# Patient Record
Sex: FEMALE | Race: AMERICAN INDIAN OR ALASKA NATIVE | Employment: FULL TIME | URBAN - METROPOLITAN AREA
[De-identification: names, ages, dates, MRNs, and addresses within clinical notes are randomized per-mention and may not be internally consistent; named-entity substitution may affect disease eponyms.]

---

## 2019-04-22 ENCOUNTER — OP HISTORICAL/CONVERTED ENCOUNTER (OUTPATIENT)
Dept: OTHER | Age: 69
End: 2019-04-22

## 2019-05-06 ENCOUNTER — OP HISTORICAL/CONVERTED ENCOUNTER (OUTPATIENT)
Dept: OTHER | Age: 69
End: 2019-05-06

## 2019-05-07 ENCOUNTER — OP HISTORICAL/CONVERTED ENCOUNTER (OUTPATIENT)
Dept: OTHER | Age: 69
End: 2019-05-07

## 2019-05-08 ENCOUNTER — IP HISTORICAL/CONVERTED ENCOUNTER (OUTPATIENT)
Dept: OTHER | Age: 69
End: 2019-05-08

## 2019-05-13 ENCOUNTER — OFFICE VISIT (OUTPATIENT)
Dept: ENDOCRINOLOGY | Age: 69
End: 2019-05-13

## 2019-05-13 VITALS
HEIGHT: 61 IN | HEART RATE: 72 BPM | RESPIRATION RATE: 18 BRPM | TEMPERATURE: 97.3 F | SYSTOLIC BLOOD PRESSURE: 124 MMHG | OXYGEN SATURATION: 98 % | BODY MASS INDEX: 26.88 KG/M2 | WEIGHT: 142.4 LBS | DIASTOLIC BLOOD PRESSURE: 76 MMHG

## 2019-05-13 DIAGNOSIS — E20.9 HYPOPARATHYROIDISM, UNSPECIFIED HYPOPARATHYROIDISM TYPE (HCC): ICD-10-CM

## 2019-05-13 DIAGNOSIS — C73 THYROID CANCER (HCC): Primary | ICD-10-CM

## 2019-05-13 DIAGNOSIS — E89.0 POSTOPERATIVE HYPOTHYROIDISM: ICD-10-CM

## 2019-05-13 RX ORDER — ATORVASTATIN CALCIUM 20 MG/1
TABLET, FILM COATED ORAL DAILY
COMMUNITY

## 2019-05-13 RX ORDER — DESLORATADINE 5 MG/1
5 TABLET ORAL
COMMUNITY
End: 2019-08-15 | Stop reason: ALTCHOICE

## 2019-05-13 RX ORDER — CALCITRIOL 0.5 UG/1
0.5 CAPSULE ORAL DAILY
Qty: 60 CAP | Refills: 1 | Status: SHIPPED | OUTPATIENT
Start: 2019-05-13 | End: 2019-08-15 | Stop reason: ALTCHOICE

## 2019-05-13 RX ORDER — ERGOCALCIFEROL 1.25 MG/1
50000 CAPSULE ORAL
COMMUNITY
End: 2019-08-15 | Stop reason: ALTCHOICE

## 2019-05-13 NOTE — PROGRESS NOTES
1. Have you been to the ER, urgent care clinic since your last visit? Hospitalized since your last visit? No    2. Have you seen or consulted any other health care providers outside of the [facility] since your last visit? Include any pap smears or colon screening.  No       Chief Complaint   Patient presents with    New Patient     pt here for thyroid issues     Learning assessment complete    Wt Readings from Last 3 Encounters:   05/13/19 142 lb 6.4 oz (64.6 kg)     Temp Readings from Last 3 Encounters:   05/13/19 97.3 °F (36.3 °C) (Oral)     BP Readings from Last 3 Encounters:   05/13/19 124/76     Pulse Readings from Last 3 Encounters:   05/13/19 72

## 2019-05-13 NOTE — PROGRESS NOTES
HISTORY OF PRESENT ILLNESS  Ada Jarvis is a 79 y.o. female. HPI  Patient is referred by Dr. Naveen Hernandez for management of Papillary  Thyroid cancer       Patient in general stayed in good state of health until  has Noticed hoarse voice 4 weeks ago   Got treated with antibiotics for a week, but hoarseness persisted     She saw Dr. Naveen Hernandez and he ordered Ct neck/ usg neck   That showed prescence of thyroid mass   He found left vocal cord palsy on laryngoscopy     He ordered for FNA of thyroid mass and left supraclavicular LN  Which revealed PTC  Ct scan showed locally invasive metastatic tissue    She underwent near total thyroidectomy with radical dissection of ipsilateral LN and central dissection of LN surgery  On May 8 2019 at [facility]            History reviewed. No pertinent past medical history.     Social History     Socioeconomic History    Marital status: UNKNOWN     Spouse name: Not on file    Number of children: Not on file    Years of education: Not on file    Highest education level: Not on file   Occupational History    Not on file   Social Needs    Financial resource strain: Not on file    Food insecurity:     Worry: Not on file     Inability: Not on file    Transportation needs:     Medical: Not on file     Non-medical: Not on file   Tobacco Use    Smoking status: Never Smoker    Smokeless tobacco: Never Used   Substance and Sexual Activity    Alcohol use: Not on file    Drug use: Not on file    Sexual activity: Not on file   Lifestyle    Physical activity:     Days per week: Not on file     Minutes per session: Not on file    Stress: Not on file   Relationships    Social connections:     Talks on phone: Not on file     Gets together: Not on file     Attends Protestant service: Not on file     Active member of club or organization: Not on file     Attends meetings of clubs or organizations: Not on file     Relationship status: Not on file    Intimate partner violence:     Fear of current or ex partner: Not on file     Emotionally abused: Not on file     Physically abused: Not on file     Forced sexual activity: Not on file   Other Topics Concern    Not on file   Social History Narrative    Not on file       History reviewed. No pertinent family history. Review of Systems   Constitutional: Negative. HENT: Negative. Eyes: Negative. Respiratory: Negative. Negative for wheezing (hoarse voice). Cardiovascular: Negative. Gastrointestinal: Negative. Genitourinary: Negative. Musculoskeletal: Negative. Skin: Negative. Neurological: Negative. Endo/Heme/Allergies: Negative. Psychiatric/Behavioral: Negative. Physical Exam   Constitutional: She is oriented to person, place, and time. She appears well-developed and well-nourished. HENT:   Head: Normocephalic. Eyes: Pupils are equal, round, and reactive to light. Conjunctivae and EOM are normal.   Neck: Normal range of motion. Neck supple. Thyromegaly (recent surgery for thyroidectomy ) present. Cardiovascular: Normal rate and regular rhythm. Pulmonary/Chest: Effort normal and breath sounds normal.   Abdominal: Soft. Bowel sounds are normal.   Musculoskeletal: Normal range of motion. Neurological: She is alert and oriented to person, place, and time. She has normal reflexes. Skin: Skin is warm and dry. Psychiatric: She has a normal mood and affect. April 22 2019 :  CT showed several low density nodules causing mass effect on left thyroid  4.1cm by 2.8 cm by 2.3 cm complex septated partially calcified mass   Several masses on left jugular vein , large supraclavivular mass 2.2 by 1.4 by 2.1 cm extending to the level of hyoid   Right lobe is normal        ASSESSMENT and PLAN    PTC METASTATIC, LOCALLY INVASIVE   S/P NEAR TOTAL THYROIDECTOMY  WITH LEFT SIDED RADICAL NECK DISSECTION     surg path :  May 8 2019   Left lobe PTC 4 cm   Central neck 3/6 LN metastatic  Left lateral neck  4/9 LN metastatic Right lobe  1 mm focus   Largest metastatic LN is 2.8 cm    AJCC  8th  pT2, pN1b   surg path   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    Definitely, patient is anxious  And requires more counseling sessions         HYPOTHYROIDISM : expected   Educated on its signs and symptoms       HYPOCALCEMIA , POST SURGICAL : on calcium supplements   Wrote  For CALCITRIOL for PRN use

## 2019-05-13 NOTE — LETTER
6/12/2019 8:44 PM 
 
Patient:  Corky Phillips YOB: 1950 Date of Visit: 5/13/2019 Dear Leland Dai [address] [place] [id number] VIA Facsimile: [phone] 
 : 
 
 
Thank you for referring Ms. Corky Phillips to me for evaluation/treatment. Below are the relevant portions of my assessment and plan of care. 1. Have you been to the ER, urgent care clinic since your last visit? Hospitalized since your last visit? No 
 
2. Have you seen or consulted any other health care providers outside of the [address] [place] [address] since your last visit? Include any pap smears or colon screening. No  
 
 
Chief Complaint Patient presents with  New Patient  
  pt here for thyroid issues Learning assessment complete Wt Readings from Last 3 Encounters:  
05/13/19 142 lb 6.4 oz (64.6 kg) Temp Readings from Last 3 Encounters:  
05/13/19 97.3 °F (36.3 °C) (Oral) BP Readings from Last 3 Encounters:  
05/13/19 124/76 Pulse Readings from Last 3 Encounters:  
05/13/19 72 HISTORY OF PRESENT ILLNESS Marquis Valdez is a 79 y.o. female. HPI Patient is referred by Dr. Ember Mittal for management of Papillary  Thyroid cancer Patient in general stayed in good state of health until  has Noticed hoarse voice 4 weeks ago Got treated with antibiotics for a week, but hoarseness persisted She saw Dr. Ember Mittal and he ordered Ct neck/ usg neck That showed prescence of thyroid mass He found left vocal cord palsy on laryngoscopy He ordered for FNA of thyroid mass and left supraclavicular LN  Which revealed PTC Ct scan showed locally invasive metastatic tissue She underwent near total thyroidectomy with radical dissection of ipsilateral LN and central dissection of LN surgery  On May 8 2019 at [facility] History reviewed. No pertinent past medical history. Social History Socioeconomic History  Marital status: UNKNOWN  
 Spouse name: Not on file  Number of children: Not on file  Years of education: Not on file  Highest education level: Not on file Occupational History  Not on file Social Needs  Financial resource strain: Not on file  Food insecurity:  
  Worry: Not on file Inability: Not on file  Transportation needs:  
  Medical: Not on file Non-medical: Not on file Tobacco Use  Smoking status: Never Smoker  Smokeless tobacco: Never Used Substance and Sexual Activity  Alcohol use: Not on file  Drug use: Not on file  Sexual activity: Not on file Lifestyle  Physical activity:  
  Days per week: Not on file Minutes per session: Not on file  Stress: Not on file Relationships  Social connections:  
  Talks on phone: Not on file Gets together: Not on file Attends [employer] service: Not on file Active member of club or organization: Not on file Attends meetings of clubs or organizations: Not on file Relationship status: Not on file  Intimate partner violence:  
  Fear of current or ex partner: Not on file Emotionally abused: Not on file Physically abused: Not on file Forced sexual activity: Not on file Other Topics Concern  Not on file Social History Narrative  Not on file History reviewed. No pertinent family history. Review of Systems Constitutional: Negative. HENT: Negative. Eyes: Negative. Respiratory: Negative. Negative for wheezing (hoarse voice). Cardiovascular: Negative. Gastrointestinal: Negative. Genitourinary: Negative. Musculoskeletal: Negative. Skin: Negative. Neurological: Negative. Endo/Heme/Allergies: Negative. Psychiatric/Behavioral: Negative. Physical Exam  
Constitutional: She is oriented to person, place, and time. She appears well-developed and well-nourished. HENT:  
Head: Normocephalic. Eyes: Pupils are equal, round, and reactive to light. Conjunctivae and EOM are normal.  
Neck: Normal range of motion. Neck supple. Thyromegaly (recent surgery for thyroidectomy ) present. Cardiovascular: Normal rate and regular rhythm. Pulmonary/Chest: Effort normal and breath sounds normal.  
Abdominal: Soft. Bowel sounds are normal.  
Musculoskeletal: Normal range of motion. Neurological: She is alert and oriented to person, place, and time. She has normal reflexes. Skin: Skin is warm and dry. Psychiatric: She has a normal mood and affect. April 22 2019 :  CT showed several low density nodules causing mass effect on left thyroid  4.1cm by 2.8 cm by 2.3 cm complex septated partially calcified mass Several masses on left jugular vein , large supraclavivular mass 2.2 by 1.4 by 2.1 cm extending to the level of hyoid Right lobe is normal 
 
 
 
ASSESSMENT and PLAN 
 
PTC METASTATIC, LOCALLY INVASIVE  
S/P NEAR TOTAL THYROIDECTOMY  WITH LEFT SIDED RADICAL NECK DISSECTION  
 
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 
 
VLADISLAV  RISK STRATIFICATION : INTERMEDIATE for recurrence WILL DO FRIAS ABLATION  
STARTED ON CYTOMEL - 25 MCG BID DOSING as pt is planning on a vacation soon WILL STOP CYTOMEL AFTER USE FOR 10 DAYS aTLEAST 2 WEEKS PRIOR FRIAS Discussed the care plan. Definitely, patient is anxious  And requires more counseling sessions HYPOTHYROIDISM : expected Educated on its signs and symptoms HYPOCALCEMIA , POST SURGICAL : on calcium supplements Wrote  For CALCITRIOL for PRN use If you have questions, please do not hesitate to call me. I look forward to following Ms. Miah Guo along with you. Sincerely, Clara Duarte MD

## 2019-05-13 NOTE — PATIENT INSTRUCTIONS
Stay on calcium (  Chewables   ) 600 mg 3 to 4 times a day       [phone]  Is my phone number       rocaltriol     0.5 mcg twice a day    (do not start until I say )

## 2019-05-14 DIAGNOSIS — E20.9 HYPOPARATHYROIDISM, UNSPECIFIED HYPOPARATHYROIDISM TYPE (HCC): ICD-10-CM

## 2019-05-14 DIAGNOSIS — C73 THYROID CANCER (HCC): ICD-10-CM

## 2019-05-15 LAB
ALBUMIN SERPL-MCNC: 4.2 G/DL (ref 3.6–4.8)
ALBUMIN/GLOB SERPL: 1.6 {RATIO} (ref 1.2–2.2)
ALP SERPL-CCNC: 90 IU/L (ref 39–117)
ALT SERPL-CCNC: 22 IU/L (ref 0–32)
AST SERPL-CCNC: 23 IU/L (ref 0–40)
BILIRUB SERPL-MCNC: 0.4 MG/DL (ref 0–1.2)
BUN SERPL-MCNC: 12 MG/DL (ref 8–27)
BUN/CREAT SERPL: 20 (ref 12–28)
CALCIUM SERPL-MCNC: 8.2 MG/DL (ref 8.7–10.3)
CHLORIDE SERPL-SCNC: 97 MMOL/L (ref 96–106)
CO2 SERPL-SCNC: 25 MMOL/L (ref 20–29)
CREAT SERPL-MCNC: 0.61 MG/DL (ref 0.57–1)
GLOBULIN SER CALC-MCNC: 2.6 G/DL (ref 1.5–4.5)
GLUCOSE SERPL-MCNC: 98 MG/DL (ref 65–99)
MAGNESIUM SERPL-MCNC: 1.9 MG/DL (ref 1.6–2.3)
POTASSIUM SERPL-SCNC: 4.6 MMOL/L (ref 3.5–5.2)
PROT SERPL-MCNC: 6.8 G/DL (ref 6–8.5)
PTH-INTACT SERPL-MCNC: 6 PG/ML (ref 15–65)
SODIUM SERPL-SCNC: 141 MMOL/L (ref 134–144)
THYROGLOB AB SERPL-ACNC: <1 IU/ML (ref 0–0.9)
TSH SERPL DL<=0.005 MIU/L-ACNC: 3.27 UIU/ML (ref 0.45–4.5)

## 2019-05-15 RX ORDER — LIOTHYRONINE SODIUM 25 UG/1
25 TABLET ORAL 2 TIMES DAILY
Qty: 60 TAB | Refills: 0 | Status: SHIPPED | OUTPATIENT
Start: 2019-05-15 | End: 2019-06-26 | Stop reason: ALTCHOICE

## 2019-05-21 DIAGNOSIS — E20.9 HYPOPARATHYROIDISM, UNSPECIFIED HYPOPARATHYROIDISM TYPE (HCC): ICD-10-CM

## 2019-05-21 DIAGNOSIS — C73 THYROID CANCER (HCC): ICD-10-CM

## 2019-05-26 LAB
25(OH)D3+25(OH)D2 SERPL-MCNC: 45.1 NG/ML (ref 30–100)
ALBUMIN SERPL-MCNC: 4.2 G/DL (ref 3.6–4.8)
ALBUMIN/GLOB SERPL: 1.5 {RATIO} (ref 1.2–2.2)
ALP SERPL-CCNC: 109 IU/L (ref 39–117)
ALT SERPL-CCNC: 21 IU/L (ref 0–32)
AST SERPL-CCNC: 22 IU/L (ref 0–40)
BILIRUB SERPL-MCNC: 0.4 MG/DL (ref 0–1.2)
BUN SERPL-MCNC: 11 MG/DL (ref 8–27)
BUN/CREAT SERPL: 16 (ref 12–28)
CALCIUM SERPL-MCNC: 8.6 MG/DL (ref 8.7–10.3)
CHLORIDE SERPL-SCNC: 101 MMOL/L (ref 96–106)
CO2 SERPL-SCNC: 23 MMOL/L (ref 20–29)
CREAT SERPL-MCNC: 0.67 MG/DL (ref 0.57–1)
GLOBULIN SER CALC-MCNC: 2.8 G/DL (ref 1.5–4.5)
GLUCOSE SERPL-MCNC: 115 MG/DL (ref 65–99)
POTASSIUM SERPL-SCNC: 5 MMOL/L (ref 3.5–5.2)
PROT SERPL-MCNC: 7 G/DL (ref 6–8.5)
PTH-INTACT SERPL-MCNC: 14 PG/ML (ref 15–65)
SODIUM SERPL-SCNC: 140 MMOL/L (ref 134–144)
T3FREE SERPL-MCNC: 6.1 PG/ML (ref 2–4.4)
T4 FREE SERPL-MCNC: 0.28 NG/DL (ref 0.82–1.77)
THYROGLOB AB SERPL-ACNC: <1 IU/ML (ref 0–0.9)
THYROGLOB SERPL-MCNC: 2.9 NG/ML
TSH SERPL DL<=0.005 MIU/L-ACNC: 0.3 UIU/ML (ref 0.45–4.5)

## 2019-05-28 DIAGNOSIS — C73 THYROID CANCER (HCC): ICD-10-CM

## 2019-05-28 DIAGNOSIS — E20.9 HYPOPARATHYROIDISM, UNSPECIFIED HYPOPARATHYROIDISM TYPE (HCC): ICD-10-CM

## 2019-06-03 DIAGNOSIS — C73 THYROID CANCER (HCC): Primary | ICD-10-CM

## 2019-06-03 LAB
ALBUMIN SERPL-MCNC: 4.1 G/DL (ref 3.6–4.8)
ALBUMIN/GLOB SERPL: 1.6 {RATIO} (ref 1.2–2.2)
ALP SERPL-CCNC: 105 IU/L (ref 39–117)
ALT SERPL-CCNC: 31 IU/L (ref 0–32)
AST SERPL-CCNC: 30 IU/L (ref 0–40)
BASOPHILS # BLD AUTO: 0 X10E3/UL (ref 0–0.2)
BASOPHILS NFR BLD AUTO: 0 %
BILIRUB SERPL-MCNC: 0.6 MG/DL (ref 0–1.2)
BUN SERPL-MCNC: 14 MG/DL (ref 8–27)
BUN/CREAT SERPL: 21 (ref 12–28)
CALCIT SERPL-MCNC: <2 PG/ML (ref 0–5)
CALCIUM SERPL-MCNC: 9.1 MG/DL (ref 8.7–10.3)
CHLORIDE SERPL-SCNC: 99 MMOL/L (ref 96–106)
CO2 SERPL-SCNC: 25 MMOL/L (ref 20–29)
CREAT SERPL-MCNC: 0.66 MG/DL (ref 0.57–1)
EOSINOPHIL # BLD AUTO: 0.1 X10E3/UL (ref 0–0.4)
EOSINOPHIL NFR BLD AUTO: 2 %
ERYTHROCYTE [DISTWIDTH] IN BLOOD BY AUTOMATED COUNT: 13.2 % (ref 12.3–15.4)
GLOBULIN SER CALC-MCNC: 2.5 G/DL (ref 1.5–4.5)
GLUCOSE SERPL-MCNC: 114 MG/DL (ref 65–99)
HCT VFR BLD AUTO: 40.4 % (ref 34–46.6)
HGB BLD-MCNC: 13.2 G/DL (ref 11.1–15.9)
IMM GRANULOCYTES # BLD AUTO: 0 X10E3/UL (ref 0–0.1)
IMM GRANULOCYTES NFR BLD AUTO: 0 %
LYMPHOCYTES # BLD AUTO: 1.6 X10E3/UL (ref 0.7–3.1)
LYMPHOCYTES NFR BLD AUTO: 23 %
MCH RBC QN AUTO: 30.6 PG (ref 26.6–33)
MCHC RBC AUTO-ENTMCNC: 32.7 G/DL (ref 31.5–35.7)
MCV RBC AUTO: 94 FL (ref 79–97)
MONOCYTES # BLD AUTO: 0.4 X10E3/UL (ref 0.1–0.9)
MONOCYTES NFR BLD AUTO: 6 %
NEUTROPHILS # BLD AUTO: 5 X10E3/UL (ref 1.4–7)
NEUTROPHILS NFR BLD AUTO: 69 %
PLATELET # BLD AUTO: 409 X10E3/UL (ref 150–450)
POTASSIUM SERPL-SCNC: 5 MMOL/L (ref 3.5–5.2)
PROT SERPL-MCNC: 6.6 G/DL (ref 6–8.5)
PTH-INTACT SERPL-MCNC: 28 PG/ML (ref 15–65)
RBC # BLD AUTO: 4.32 X10E6/UL (ref 3.77–5.28)
SODIUM SERPL-SCNC: 140 MMOL/L (ref 134–144)
THYROGLOB AB SERPL-ACNC: <1 IU/ML (ref 0–0.9)
THYROGLOB SERPL-MCNC: <2 NG/ML
TSH SERPL DL<=0.005 MIU/L-ACNC: 0.1 UIU/ML (ref 0.45–4.5)
WBC # BLD AUTO: 7.2 X10E3/UL (ref 3.4–10.8)

## 2019-06-10 ENCOUNTER — OFFICE VISIT (OUTPATIENT)
Dept: ENDOCRINOLOGY | Age: 69
End: 2019-06-10

## 2019-06-10 VITALS
BODY MASS INDEX: 27.3 KG/M2 | RESPIRATION RATE: 18 BRPM | HEART RATE: 77 BPM | TEMPERATURE: 97.2 F | WEIGHT: 144.6 LBS | SYSTOLIC BLOOD PRESSURE: 130 MMHG | HEIGHT: 61 IN | OXYGEN SATURATION: 98 % | DIASTOLIC BLOOD PRESSURE: 76 MMHG

## 2019-06-10 DIAGNOSIS — E20.9 HYPOPARATHYROIDISM, UNSPECIFIED HYPOPARATHYROIDISM TYPE (HCC): ICD-10-CM

## 2019-06-10 DIAGNOSIS — C73 THYROID CANCER (HCC): Primary | ICD-10-CM

## 2019-06-10 RX ORDER — ACETAMINOPHEN 325 MG/1
TABLET ORAL
COMMUNITY

## 2019-06-10 NOTE — PATIENT INSTRUCTIONS
--------------------------------------------------------------------------------------------    Refills    -    please call your pharmacy and have them send us a refill request    Results  -  allow up to a week for lab results to be processed and reviewed. Phone calls  -  Allow upto 24 hrs.  for non-urgent calls to be retained    Prior authorization - It may take up to 4 weeks to process, depending on your insurance    Forms  -  FMLA, DMV, patient assistance, etc. will take up to 2 weeks to process    Cancellations - please notify the office in advance if you cannot keep your appointment    Samples  - will only be dispensed at visits as supply is limited      If you are having a medical emergency call 911    --------------------------------------------------------------------------------------------

## 2019-06-10 NOTE — PROGRESS NOTES
1. Have you been to the ER, urgent care clinic since your last visit? No  Hospitalized since your last visit? No    2. Have you seen or consulted any other health care providers outside of the [address] [place] [address] since your last visit? Include any pap smears or colon screening.  No      Wt Readings from Last 3 Encounters:   06/10/19 144 lb 9.6 oz (65.6 kg)   05/13/19 142 lb 6.4 oz (64.6 kg)     Temp Readings from Last 3 Encounters:   06/10/19 97.2 °F (36.2 °C) (Oral)   05/13/19 97.3 °F (36.3 °C) (Oral)     BP Readings from Last 3 Encounters:   06/10/19 130/76   05/13/19 124/76     Pulse Readings from Last 3 Encounters:   06/10/19 77   05/13/19 72

## 2019-06-12 DIAGNOSIS — C73 THYROID CANCER (HCC): Primary | ICD-10-CM

## 2019-06-13 DIAGNOSIS — C73 THYROID CANCER (HCC): Primary | ICD-10-CM

## 2019-06-14 LAB
25(OH)D3+25(OH)D2 SERPL-MCNC: 38.3 NG/ML (ref 30–100)
ALBUMIN SERPL-MCNC: 4.2 G/DL (ref 3.6–4.8)
ALBUMIN/GLOB SERPL: 1.7 {RATIO} (ref 1.2–2.2)
ALP SERPL-CCNC: 89 IU/L (ref 39–117)
ALT SERPL-CCNC: 28 IU/L (ref 0–32)
AST SERPL-CCNC: 31 IU/L (ref 0–40)
BILIRUB SERPL-MCNC: 0.4 MG/DL (ref 0–1.2)
BUN SERPL-MCNC: 7 MG/DL (ref 8–27)
BUN/CREAT SERPL: 9 (ref 12–28)
CALCIUM SERPL-MCNC: 9.3 MG/DL (ref 8.7–10.3)
CHLORIDE SERPL-SCNC: 99 MMOL/L (ref 96–106)
CO2 SERPL-SCNC: 24 MMOL/L (ref 20–29)
CREAT SERPL-MCNC: 0.74 MG/DL (ref 0.57–1)
GLOBULIN SER CALC-MCNC: 2.5 G/DL (ref 1.5–4.5)
GLUCOSE SERPL-MCNC: 91 MG/DL (ref 65–99)
POTASSIUM SERPL-SCNC: 4.6 MMOL/L (ref 3.5–5.2)
PROT SERPL-MCNC: 6.7 G/DL (ref 6–8.5)
PTH-INTACT SERPL-MCNC: 28 PG/ML (ref 15–65)
SODIUM SERPL-SCNC: 136 MMOL/L (ref 134–144)
THYROGLOB AB SERPL-ACNC: <1 IU/ML
THYROGLOB SERPL-MCNC: 8.9 NG/ML
THYROGLOB SERPL-MCNC: NORMAL NG/ML
TSH SERPL DL<=0.005 MIU/L-ACNC: 27.74 UIU/ML (ref 0.45–4.5)

## 2019-06-17 DIAGNOSIS — C73 THYROID CANCER (HCC): ICD-10-CM

## 2019-06-18 LAB — TSH SERPL DL<=0.005 MIU/L-ACNC: 37.98 UIU/ML (ref 0.45–4.5)

## 2019-06-19 ENCOUNTER — HOSPITAL ENCOUNTER (OUTPATIENT)
Dept: NUCLEAR MEDICINE | Age: 69
Discharge: HOME | End: 2019-06-19
Attending: INTERNAL MEDICINE
Payer: COMMERCIAL

## 2019-06-19 DIAGNOSIS — C73 THYROID CANCER (HCC): ICD-10-CM

## 2019-06-19 PROCEDURE — A9517 I131 IODIDE CAP, RX: HCPCS

## 2019-06-26 ENCOUNTER — HOSPITAL ENCOUNTER (OUTPATIENT)
Dept: NUCLEAR MEDICINE | Age: 69
Discharge: HOME | End: 2019-06-26
Attending: INTERNAL MEDICINE
Payer: COMMERCIAL

## 2019-06-26 DIAGNOSIS — C73 THYROID CANCER (HCC): Primary | ICD-10-CM

## 2019-06-26 DIAGNOSIS — E89.0 POSTOPERATIVE HYPOTHYROIDISM: ICD-10-CM

## 2019-06-26 DIAGNOSIS — C73 THYROID CANCER (HCC): ICD-10-CM

## 2019-06-26 PROCEDURE — 78018 THYROID MET IMAGING BODY: CPT

## 2019-06-26 RX ORDER — LEVOTHYROXINE SODIUM 125 UG/1
125 TABLET ORAL
Qty: 30 TAB | Refills: 6 | Status: SHIPPED | OUTPATIENT
Start: 2019-06-26 | End: 2019-09-30 | Stop reason: DRUGHIGH

## 2019-06-27 DIAGNOSIS — C73 THYROID CANCER (HCC): ICD-10-CM

## 2019-06-27 DIAGNOSIS — E89.0 POSTOPERATIVE HYPOTHYROIDISM: ICD-10-CM

## 2019-06-30 LAB
THYROGLOB AB SERPL-ACNC: <1 IU/ML
THYROGLOB SERPL-MCNC: 160 NG/ML
THYROGLOB SERPL-MCNC: ABNORMAL NG/ML
TSH SERPL DL<=0.005 MIU/L-ACNC: 34.96 UIU/ML (ref 0.45–4.5)

## 2019-08-07 DIAGNOSIS — E20.9 HYPOPARATHYROIDISM, UNSPECIFIED HYPOPARATHYROIDISM TYPE (HCC): ICD-10-CM

## 2019-08-07 DIAGNOSIS — E78.2 MIXED HYPERLIPIDEMIA: ICD-10-CM

## 2019-08-07 DIAGNOSIS — C73 THYROID CANCER (HCC): Primary | ICD-10-CM

## 2019-08-07 DIAGNOSIS — C73 THYROID CANCER (HCC): ICD-10-CM

## 2019-08-07 DIAGNOSIS — Z13.1 DIABETES MELLITUS SCREENING: ICD-10-CM

## 2019-08-15 ENCOUNTER — OFFICE VISIT (OUTPATIENT)
Dept: ENDOCRINOLOGY | Age: 69
End: 2019-08-15

## 2019-08-15 VITALS
HEART RATE: 74 BPM | RESPIRATION RATE: 18 BRPM | SYSTOLIC BLOOD PRESSURE: 126 MMHG | TEMPERATURE: 96.6 F | BODY MASS INDEX: 27.2 KG/M2 | HEIGHT: 61 IN | DIASTOLIC BLOOD PRESSURE: 80 MMHG | OXYGEN SATURATION: 100 % | WEIGHT: 144.1 LBS

## 2019-08-15 DIAGNOSIS — C73 THYROID CANCER (HCC): ICD-10-CM

## 2019-08-15 DIAGNOSIS — E89.0 POSTOPERATIVE HYPOTHYROIDISM: Primary | ICD-10-CM

## 2019-08-15 DIAGNOSIS — R73.03 PRE-DIABETES: ICD-10-CM

## 2019-08-15 NOTE — LETTER
8/18/19 Patient: Yassine Mckenna YOB: 1950 Date of Visit: 8/15/2019 Derian Hernandez MD 
[address] [facility] [address] [address] [place] [address] VIA Facsimile: [phone] Dear Derian Hernandez MD, Thank you for referring Ms. Audrey Grayson to [facility] for evaluation. My notes for this consultation are attached. If you have questions, please do not hesitate to call me. I look forward to following your patient along with you. Sincerely, Taryn Correa MD

## 2019-08-15 NOTE — PROGRESS NOTES
HISTORY OF PRESENT ILLNESS  Bill Wade is a 81 y.o. female. HPI  Patient is here for follow up  for management of Papillary  Thyroid cancer    Last visit  From  June 2019         She is here with her    Feels at times a bit anxious , feels warmth on palms and soles     Denies any palpitations  Weight is stable       Old history      Gained 2 lbs since last seen   Returned from vacation   On the phone, asked her to stop cytomel on may 28 2019     She feels tired and has slight wheeze on conversing   She is taking allegra tylenol PRN basis   In the interim,  I  Had  a counseling session  Soon after the initial visit . Old history :   Patient in general good state of health has Noticed hoarse voice 4 weeks ago   Got treated with antibiotics for a week, but hoarseness persisted     She saw Dr. Elizabeth Malin and he ordered Ct/ usg neck   He found left vocal cord palsy on laryngoscopy     He ordered for FNA of thyroid mass and left supraclavicular LN  Which revealed PTC  Ct scan showed locally invasive metastatic tissues involvement       She underwent near total thyroidectomy with radical dissection of ipsilateral LN and central dissection of LN surgery  On May 8 2019 at [facility]            History reviewed. No pertinent past medical history.     Social History     Socioeconomic History    Marital status:      Spouse name: Not on file    Number of children: Not on file    Years of education: Not on file    Highest education level: Not on file   Occupational History    Not on file   Social Needs    Financial resource strain: Not on file    Food insecurity:     Worry: Not on file     Inability: Not on file    Transportation needs:     Medical: Not on file     Non-medical: Not on file   Tobacco Use    Smoking status: Never Smoker    Smokeless tobacco: Never Used   Substance and Sexual Activity    Alcohol use: Not on file    Drug use: Not on file    Sexual activity: Not on file   Lifestyle    Physical activity:     Days per week: Not on file     Minutes per session: Not on file    Stress: Not on file   Relationships    Social connections:     Talks on phone: Not on file     Gets together: Not on file     Attends Christianity service: Not on file     Active member of club or organization: Not on file     Attends meetings of clubs or organizations: Not on file     Relationship status: Not on file    Intimate partner violence:     Fear of current or ex partner: Not on file     Emotionally abused: Not on file     Physically abused: Not on file     Forced sexual activity: Not on file   Other Topics Concern    Not on file   Social History Narrative    Not on file       History reviewed. No pertinent family history. Review of Systems   Constitutional: Negative. HENT: Negative. Eyes: Negative. Respiratory: Negative. Cardiovascular: Negative. Gastrointestinal: Negative. Genitourinary: Negative. Musculoskeletal: Negative. Skin: Negative. Neurological: Negative. Endo/Heme/Allergies: Negative. Psychiatric/Behavioral: Negative. Physical Exam   Constitutional: She is oriented to person, place, and time. She appears well-developed and well-nourished. HENT:   Head: Normocephalic. Eyes: Pupils are equal, round, and reactive to light. Conjunctivae and EOM are normal.   Neck: Normal range of motion. Neck supple. (healed surgical scar for thyroidectomy ) present. Cardiovascular: Normal rate and regular rhythm. Pulmonary/Chest: Effort normal and breath sounds normal.   Abdominal: Soft. Bowel sounds are normal.   Musculoskeletal: Normal range of motion. Neurological: She is alert and oriented to person, place, and time. She has normal reflexes. Skin: Skin is warm and dry. Psychiatric: She has a normal mood and affect.        April 22 2019 :  CT showed several low density nodules causing mass effect on left thyroid  4.1cm by 2.8 cm by 2.3 cm complex septated partially calcified mass   Several masses on left jugular vein , large supraclavivular mass 2.2 by 1.4 by 2.1 cm extending to the level of hyoid   Right lobe is normal      ASSESSMENT and PLAN    PTC METASTATIC, LOCALLY INVASIVE   S/P NEAR TOTAL THYROIDECTOMY  WITH LEFT SIDED RADICAL NECK DISSECTION     surg path :  May 8 2019   Left lobe PTC 4 cm   Central neck 3/6 LN metastatic  Left lateral neck  4/9 LN metastatic   Right lobe  1 mm focus   Largest metastatic LN is 2.8 cm    AJCC  8th  pT2, pN1b   surg path   Pt 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 recurrent laryngeal nerve involved- it is therefore  PT4a, p N1b   It is STAGE IV     VLADISLAV  RISK STRATIFICATION : INTERMEDIATE for recurrence     S/p   FRIAS ABLATION   June 13 2019   WBS - localized in neck, no other spread         HYPOTHYROIDISM :    On SYNTHRODI 125 MCG DOSE   TSH  IS SUPPRESSED AND IS NEEDED FOR THYROID CANCER MANAGEMENT       HYPOCALCEMIA , POST SURGICAL : resolved   She is on general calcium supplementation     Pre- diabetes : A1c is 5.7  %      > 50 % of time is spent on counseling   Patient voiced understanding her plan of care                 > 50 % of time is spent on counseling , time spent longer for counseling session  Patient voiced understanding her plan of care

## 2019-08-15 NOTE — PROGRESS NOTES
1. Have you been to the ER, urgent care clinic since your last visit? No  Hospitalized since your last visit? No    2. Have you seen or consulted any other health care providers outside of the [address] [place] [address] since your last visit? Include any pap smears or colon screening.  No     Wt Readings from Last 3 Encounters:   08/15/19 144 lb 1.6 oz (65.4 kg)   06/10/19 144 lb 9.6 oz (65.6 kg)   05/13/19 142 lb 6.4 oz (64.6 kg)     Temp Readings from Last 3 Encounters:   08/15/19 96.6 °F (35.9 °C) (Oral)   06/10/19 97.2 °F (36.2 °C) (Oral)   05/13/19 97.3 °F (36.3 °C) (Oral)     BP Readings from Last 3 Encounters:   08/15/19 126/80   06/10/19 130/76   05/13/19 124/76     Pulse Readings from Last 3 Encounters:   08/15/19 74   06/10/19 77   05/13/19 72

## 2019-08-15 NOTE — PATIENT INSTRUCTIONS
Levothyroxine  125 mcg  A day, on empty stomach with water only, no other meds or food or drinks   For next half hour       Take any kind of vitamins, calcium, iron   Pills  4 hours later

## 2019-08-17 LAB
25(OH)D3+25(OH)D2 SERPL-MCNC: 46.1 NG/ML (ref 30–100)
ALBUMIN SERPL-MCNC: 4.2 G/DL (ref 3.6–4.8)
ALBUMIN/GLOB SERPL: 1.8 {RATIO} (ref 1.2–2.2)
ALP SERPL-CCNC: 85 IU/L (ref 39–117)
ALT SERPL-CCNC: 25 IU/L (ref 0–32)
AST SERPL-CCNC: 26 IU/L (ref 0–40)
BASOPHILS # BLD AUTO: 0 X10E3/UL (ref 0–0.2)
BASOPHILS NFR BLD AUTO: 1 %
BILIRUB SERPL-MCNC: 0.4 MG/DL (ref 0–1.2)
BUN SERPL-MCNC: 10 MG/DL (ref 8–27)
BUN/CREAT SERPL: 14 (ref 12–28)
CALCIUM SERPL-MCNC: 9.1 MG/DL (ref 8.7–10.3)
CHLORIDE SERPL-SCNC: 100 MMOL/L (ref 96–106)
CHOLEST SERPL-MCNC: 135 MG/DL (ref 100–199)
CO2 SERPL-SCNC: 24 MMOL/L (ref 20–29)
CREAT SERPL-MCNC: 0.69 MG/DL (ref 0.57–1)
EOSINOPHIL # BLD AUTO: 0.1 X10E3/UL (ref 0–0.4)
EOSINOPHIL NFR BLD AUTO: 1 %
ERYTHROCYTE [DISTWIDTH] IN BLOOD BY AUTOMATED COUNT: 13.3 % (ref 12.3–15.4)
EST. AVERAGE GLUCOSE BLD GHB EST-MCNC: 117 MG/DL
GLOBULIN SER CALC-MCNC: 2.4 G/DL (ref 1.5–4.5)
GLUCOSE SERPL-MCNC: 84 MG/DL (ref 65–99)
HBA1C MFR BLD: 5.7 % (ref 4.8–5.6)
HCT VFR BLD AUTO: 39.1 % (ref 34–46.6)
HDLC SERPL-MCNC: 62 MG/DL
HGB BLD-MCNC: 12.6 G/DL (ref 11.1–15.9)
IMM GRANULOCYTES # BLD AUTO: 0 X10E3/UL (ref 0–0.1)
IMM GRANULOCYTES NFR BLD AUTO: 0 %
INTERPRETATION, 910389: NORMAL
LDLC SERPL CALC-MCNC: 61 MG/DL (ref 0–99)
LYMPHOCYTES # BLD AUTO: 1.3 X10E3/UL (ref 0.7–3.1)
LYMPHOCYTES NFR BLD AUTO: 24 %
MCH RBC QN AUTO: 30.7 PG (ref 26.6–33)
MCHC RBC AUTO-ENTMCNC: 32.2 G/DL (ref 31.5–35.7)
MCV RBC AUTO: 95 FL (ref 79–97)
MONOCYTES # BLD AUTO: 0.5 X10E3/UL (ref 0.1–0.9)
MONOCYTES NFR BLD AUTO: 8 %
NEUTROPHILS # BLD AUTO: 3.6 X10E3/UL (ref 1.4–7)
NEUTROPHILS NFR BLD AUTO: 66 %
PHOSPHATE SERPL-MCNC: 4.4 MG/DL (ref 2.5–4.5)
PLATELET # BLD AUTO: 373 X10E3/UL (ref 150–450)
POTASSIUM SERPL-SCNC: 4.7 MMOL/L (ref 3.5–5.2)
PROT SERPL-MCNC: 6.6 G/DL (ref 6–8.5)
PTH-INTACT SERPL-MCNC: 23 PG/ML (ref 15–65)
RBC # BLD AUTO: 4.11 X10E6/UL (ref 3.77–5.28)
SODIUM SERPL-SCNC: 140 MMOL/L (ref 134–144)
T4 FREE SERPL-MCNC: 2.03 NG/DL (ref 0.82–1.77)
THYROGLOB AB SERPL-ACNC: 3.6 IU/ML
THYROGLOB SERPL-MCNC: 4.4 NG/ML
THYROGLOB SERPL-MCNC: ABNORMAL NG/ML
TRIGL SERPL-MCNC: 60 MG/DL (ref 0–149)
TSH SERPL DL<=0.005 MIU/L-ACNC: 0.03 UIU/ML (ref 0.45–4.5)
VLDLC SERPL CALC-MCNC: 12 MG/DL (ref 5–40)
WBC # BLD AUTO: 5.5 X10E3/UL (ref 3.4–10.8)

## 2019-09-26 DIAGNOSIS — E89.0 POSTOPERATIVE HYPOTHYROIDISM: ICD-10-CM

## 2019-09-26 DIAGNOSIS — R73.03 PRE-DIABETES: ICD-10-CM

## 2019-09-26 DIAGNOSIS — C73 THYROID CANCER (HCC): ICD-10-CM

## 2019-09-27 LAB
EST. AVERAGE GLUCOSE BLD GHB EST-MCNC: 117 MG/DL
HBA1C MFR BLD: 5.7 % (ref 4.8–5.6)
T4 FREE SERPL-MCNC: 2.35 NG/DL (ref 0.82–1.77)
TSH SERPL DL<=0.005 MIU/L-ACNC: <0.006 UIU/ML (ref 0.45–4.5)

## 2019-09-30 ENCOUNTER — OFFICE VISIT (OUTPATIENT)
Dept: ENDOCRINOLOGY | Age: 69
End: 2019-09-30

## 2019-09-30 VITALS
DIASTOLIC BLOOD PRESSURE: 72 MMHG | HEART RATE: 68 BPM | SYSTOLIC BLOOD PRESSURE: 117 MMHG | OXYGEN SATURATION: 100 % | RESPIRATION RATE: 18 BRPM | BODY MASS INDEX: 26.39 KG/M2 | WEIGHT: 139.8 LBS | HEIGHT: 61 IN | TEMPERATURE: 96.7 F

## 2019-09-30 DIAGNOSIS — E89.0 POSTOPERATIVE HYPOTHYROIDISM: Primary | ICD-10-CM

## 2019-09-30 DIAGNOSIS — R73.03 PRE-DIABETES: ICD-10-CM

## 2019-09-30 DIAGNOSIS — C73 THYROID CANCER (HCC): ICD-10-CM

## 2019-09-30 RX ORDER — LEVOTHYROXINE SODIUM 75 UG/1
TABLET ORAL
Qty: 45 TAB | Refills: 12 | Status: SHIPPED | OUTPATIENT
Start: 2019-09-30 | End: 2019-11-12 | Stop reason: DRUGHIGH

## 2019-09-30 RX ORDER — GUAIFENESIN 100 MG/5ML
81 LIQUID (ML) ORAL AS NEEDED
COMMUNITY

## 2019-09-30 NOTE — PROGRESS NOTES
Room 3    Identified pt with two pt identifiers(name and ). Reviewed record in preparation for visit and have obtained necessary documentation. All patient medications has been reviewed. Chief Complaint   Patient presents with    Thyroid Problem       Health Maintenance Due   Topic    Hepatitis C Screening     DTaP/Tdap/Td series (1 - Tdap)    Shingrix Vaccine Age 50> (1 of 2)    BREAST CANCER SCRN MAMMOGRAM     FOBT Q 1 YEAR AGE 50-75     GLAUCOMA SCREENING Q2Y     Bone Densitometry (Dexa) Screening     Pneumococcal 65+ years (1 of 2 - PCV13)    Influenza Age 5 to Adult        Vitals:    19 1617   BP: 117/72   Pulse: 68   Resp: 18   Temp: 96.7 °F (35.9 °C)   TempSrc: Oral   SpO2: 100%   Weight: 139 lb 12.8 oz (63.4 kg)   Height: 5' 1\" (1.549 m)   PainSc:   0 - No pain       Coordination of Care Questionnaire:   1) Have you been to an emergency room, urgent care, or hospitalized since your last visit?   no       2. Have seen or consulted any other health care provider since your last visit? NO    3) Do you have an Advanced Directive/ Living Will in place? NO  If yes, do we have a copy on file NO  If no, would you like information NO    Patient is accompanied by self I have received verbal consent from Spencer Sifuentes to discuss any/all medical information while they are present in the room.

## 2019-09-30 NOTE — PATIENT INSTRUCTIONS
Take calcium 500 mg one pill twice   A   Day       ---------------------------------------------------------------------------------------------------------------      unithroid   75 mcg  A day,   BRAND    Extra pill on Tuesday , Thursday and  Saturday     on empty stomach with water only, no other meds or food or drinks   For next half hour         Take any kind of vitamins, calcium, iron   Pills  4 hours later      ------------------------------------------------------------------------------------------------    STOP PILLS ON Tuesday AND Wednesday   On Thursday and Friday - one pill of 75 mcg   Saturday 2 pills . ..      And follow TTS

## 2019-09-30 NOTE — PROGRESS NOTES
HISTORY OF PRESENT ILLNESS  Kvng Dominguez is a 81 y.o. female. HPI  Patient is here for follow up  for management of Papillary  Thyroid cancer    Last visit  From  August 2019        Lost 5 lbs   Feels at times a bit anxious , feels warmth on palms and soles   Here to discuss labs     Old history :      She is here with her    Feels at times a bit anxious , feels warmth on palms and soles     Denies any palpitations  Weight is stable       Old history      Gained 2 lbs since last seen   Returned from vacation   On the phone, asked her to stop cytomel on may 28 2019     She feels tired and has slight wheeze on conversing   She is taking allegra tylenol PRN basis   In the interim,  I  Had  a counseling session  Soon after the initial visit . Old history :   Patient in general good state of health has Noticed hoarse voice 4 weeks ago   Got treated with antibiotics for a week, but hoarseness persisted     She saw Dr. Lisandro Varela and he ordered Ct/ usg neck   He found left vocal cord palsy on laryngoscopy     He ordered for FNA of thyroid mass and left supraclavicular LN  Which revealed PTC  Ct scan showed locally invasive metastatic tissues involvement       She underwent near total thyroidectomy with radical dissection of ipsilateral LN and central dissection of LN surgery  On May 8 2019 at [facility]            No past medical history on file.     Social History     Socioeconomic History    Marital status:      Spouse name: Not on file    Number of children: Not on file    Years of education: Not on file    Highest education level: Not on file   Occupational History    Not on file   Social Needs    Financial resource strain: Not on file    Food insecurity:     Worry: Not on file     Inability: Not on file    Transportation needs:     Medical: Not on file     Non-medical: Not on file   Tobacco Use    Smoking status: Never Smoker    Smokeless tobacco: Never Used   Substance and Sexual Activity    Alcohol use: Yes     Comment: rarely    Drug use: Never    Sexual activity: Not on file   Lifestyle    Physical activity:     Days per week: Not on file     Minutes per session: Not on file    Stress: Not on file   Relationships    Social connections:     Talks on phone: Not on file     Gets together: Not on file     Attends Hoahaoism service: Not on file     Active member of club or organization: Not on file     Attends meetings of clubs or organizations: Not on file     Relationship status: Not on file    Intimate partner violence:     Fear of current or ex partner: Not on file     Emotionally abused: Not on file     Physically abused: Not on file     Forced sexual activity: Not on file   Other Topics Concern    Not on file   Social History Narrative    Not on file       No family history on file. Review of Systems   Constitutional: Negative. HENT: Negative. Eyes: Negative. Respiratory: Negative. Cardiovascular: Negative. Gastrointestinal: Negative. Genitourinary: Negative. Musculoskeletal: Negative. Skin: Negative. Neurological: Negative. Endo/Heme/Allergies: Negative. Psychiatric/Behavioral: Negative. Physical Exam   Constitutional: She is oriented to person, place, and time. She appears well-developed and well-nourished. HENT:   Head: Normocephalic. Eyes: Pupils are equal, round, and reactive to light. Conjunctivae and EOM are normal.   Neck: Normal range of motion. Neck supple. (healed surgical scar for thyroidectomy ) present. Cardiovascular: Normal rate and regular rhythm. Pulmonary/Chest: Effort normal and breath sounds normal.   Abdominal: Soft. Bowel sounds are normal.   Musculoskeletal: Normal range of motion. Neurological: She is alert and oriented to person, place, and time. She has normal reflexes. Skin: Skin is warm and dry. Psychiatric: She has a normal mood and affect.        April 22 2019 :  CT showed several low density nodules causing mass effect on left thyroid  4.1cm by 2.8 cm by 2.3 cm complex septated partially calcified mass   Several masses on left jugular vein , large supraclavivular mass 2.2 by 1.4 by 2.1 cm extending to the level of hyoid   Right lobe is normal      ASSESSMENT and PLAN    PTC METASTATIC, LOCALLY INVASIVE   S/P NEAR TOTAL THYROIDECTOMY  WITH LEFT SIDED RADICAL NECK DISSECTION     surg path :  May 8 2019   Left lobe PTC 4 cm   Central neck 3/6 LN metastatic  Left lateral neck  4/9 LN metastatic   Right lobe  1 mm focus   Largest metastatic LN is 2.8 cm    AJCC  8th  pT2, pN1b   surg path   Pt 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 recurrent laryngeal nerve involved- it is therefore  PT4a, p N1b   It is STAGE IV     VLADISLAV  RISK STRATIFICATION : INTERMEDIATE for recurrence     S/p   FRIAS ABLATION   June 13 2019   WBS - localized in neck, no other spread     TG by [facility] [place], operated by [facility]  In June 2019  Was 8.9     And  TSH was 27      ,  when the thyroid antibodies are negative  TG by [facility]   On aug 7 2019  is 4.4,  when TSH is suppressed to 0.028  And TG is positive   Will do f/u usg and comprehensive TG panel in 3 months , dec 2019         HYPOTHYROIDISM :    On SYNTHRODI 125 MCG DOSE   TSH  IS SUPPRESSED AND it IS NEEDED FOR THYROID CANCER MANAGEMENT     Sept 2019  :   TSH is  0.006 , Suppressed on current dose of 125 mcg dose   Will do 106 mcg a day  ( by making her take 75 mcg unithrodi 10 pills  A week )   One pill every day but on Tue, Thursday and sat 2 pills  A day         HYPOCALCEMIA , POST SURGICAL : resolved   She is on general calcium supplementation     Pre- diabetes : A1c is 5.7  %      > 50 % of time is spent on counseling   Patient voiced understanding her plan of care                 > 50 % of time is spent on counseling , time spent longer for counseling session  Patient voiced understanding her plan of care

## 2019-10-01 DIAGNOSIS — C73 THYROID CANCER (HCC): ICD-10-CM

## 2019-10-01 DIAGNOSIS — E89.0 POSTOPERATIVE HYPOTHYROIDISM: Primary | ICD-10-CM

## 2019-11-08 ENCOUNTER — APPOINTMENT (OUTPATIENT)
Dept: ENDOCRINOLOGY | Age: 69
End: 2019-11-08

## 2019-11-08 DIAGNOSIS — C73 THYROID CANCER (HCC): ICD-10-CM

## 2019-11-08 DIAGNOSIS — E89.0 POSTOPERATIVE HYPOTHYROIDISM: ICD-10-CM

## 2019-11-12 DIAGNOSIS — E89.0 POSTOPERATIVE HYPOTHYROIDISM: Primary | ICD-10-CM

## 2019-11-12 LAB
ALBUMIN SERPL-MCNC: 4.1 G/DL (ref 3.6–4.8)
ALBUMIN/GLOB SERPL: 1.6 {RATIO} (ref 1.2–2.2)
ALP SERPL-CCNC: 91 IU/L (ref 39–117)
ALT SERPL-CCNC: 20 IU/L (ref 0–32)
AST SERPL-CCNC: 21 IU/L (ref 0–40)
BILIRUB SERPL-MCNC: 0.4 MG/DL (ref 0–1.2)
BUN SERPL-MCNC: 9 MG/DL (ref 8–27)
BUN/CREAT SERPL: 12 (ref 12–28)
CALCIUM SERPL-MCNC: 9.1 MG/DL (ref 8.7–10.3)
CHLORIDE SERPL-SCNC: 104 MMOL/L (ref 96–106)
CO2 SERPL-SCNC: 24 MMOL/L (ref 20–29)
CREAT SERPL-MCNC: 0.77 MG/DL (ref 0.57–1)
GLOBULIN SER CALC-MCNC: 2.5 G/DL (ref 1.5–4.5)
GLUCOSE SERPL-MCNC: 100 MG/DL (ref 65–99)
POTASSIUM SERPL-SCNC: 4.8 MMOL/L (ref 3.5–5.2)
PROT SERPL-MCNC: 6.6 G/DL (ref 6–8.5)
SODIUM SERPL-SCNC: 140 MMOL/L (ref 134–144)
T4 FREE SERPL-MCNC: 1.87 NG/DL (ref 0.82–1.77)
THYROGLOB AB SERPL-ACNC: <1 IU/ML
THYROGLOB SERPL-MCNC: 0.1 NG/ML
THYROGLOB SERPL-MCNC: NORMAL NG/ML
TSH SERPL DL<=0.005 MIU/L-ACNC: 0.01 UIU/ML (ref 0.45–4.5)

## 2019-11-12 RX ORDER — LEVOTHYROXINE SODIUM 100 UG/1
TABLET ORAL
Qty: 35 TAB | Refills: 3 | Status: SHIPPED | OUTPATIENT
Start: 2019-11-12 | End: 2020-02-14

## 2019-11-12 NOTE — PROGRESS NOTES
I  Discussed with pt   Will change dose to  100 daily  And take extra half pill which is on sundays (150 mcg )  Which is the same dose as before     Rodrigo Kaur MD

## 2019-12-04 ENCOUNTER — HOSPITAL ENCOUNTER (OUTPATIENT)
Dept: ULTRASOUND IMAGING | Age: 69
Discharge: HOME | End: 2019-12-04
Attending: INTERNAL MEDICINE
Payer: COMMERCIAL

## 2019-12-04 DIAGNOSIS — R73.03 PRE-DIABETES: ICD-10-CM

## 2019-12-04 DIAGNOSIS — E89.0 POSTOPERATIVE HYPOTHYROIDISM: ICD-10-CM

## 2019-12-04 DIAGNOSIS — C73 THYROID CANCER (HCC): ICD-10-CM

## 2019-12-04 PROCEDURE — 76536 US EXAM OF HEAD AND NECK: CPT

## 2019-12-09 ENCOUNTER — HOSPITAL ENCOUNTER (OUTPATIENT)
Dept: LAB | Age: 69
Discharge: HOME | End: 2019-12-09

## 2019-12-09 DIAGNOSIS — E89.0 POSTOPERATIVE HYPOTHYROIDISM: ICD-10-CM

## 2019-12-10 LAB
T4 FREE SERPL-MCNC: 1.7 NG/DL (ref 0.8–1.5)
TSH SERPL DL<=0.05 MIU/L-ACNC: <0.01 UIU/ML (ref 0.36–3.74)

## 2019-12-27 ENCOUNTER — TELEPHONE (OUTPATIENT)
Dept: ENDOCRINOLOGY | Age: 69
End: 2019-12-27

## 2019-12-27 DIAGNOSIS — E89.0 POSTOPERATIVE HYPOTHYROIDISM: Primary | ICD-10-CM

## 2019-12-27 NOTE — TELEPHONE ENCOUNTER
Patient called to schedule lab, unsure if there are open orders for any upcoming I had though she was recently here for some.  Can you please check, thanks

## 2020-01-08 ENCOUNTER — LAB ONLY (OUTPATIENT)
Dept: ENDOCRINOLOGY | Age: 70
End: 2020-01-08

## 2020-01-08 DIAGNOSIS — E89.0 POSTOPERATIVE HYPOTHYROIDISM: Primary | ICD-10-CM

## 2020-01-09 LAB
T4 FREE SERPL-MCNC: 1.95 NG/DL (ref 0.82–1.77)
TSH SERPL DL<=0.005 MIU/L-ACNC: <0.006 UIU/ML (ref 0.45–4.5)

## 2020-01-09 NOTE — PROGRESS NOTES
Just informed her to take full 100 mcg pill Monday to Saturday and on sundays - half pill only       Melissa Parker MD

## 2020-02-14 ENCOUNTER — DOCUMENTATION ONLY (OUTPATIENT)
Dept: ENDOCRINOLOGY | Age: 70
End: 2020-02-14

## 2020-02-14 RX ORDER — LEVOTHYROXINE SODIUM 100 UG/1
TABLET ORAL
Qty: 35 TAB | Refills: 3 | Status: SHIPPED | OUTPATIENT
Start: 2020-02-14 | End: 2020-03-11

## 2020-02-25 NOTE — PROGRESS NOTES
HISTORY OF PRESENT ILLNESS  Pino De Leon is a 79 y.o. female. HPI  Patient is referred by Dr. Liz Rajput for management of Papillary  Thyroid cancer   From  May 13 2019         Gained 2 lbs since last seen   Returned from vacation   On the phone, asked her to stop cytomel on may 28 2019     She feels tired and has slight wheeze on conversing   She is taking allegra tylenol PRN basis   In the interim,  I  Had  a counseling session  Soon after the initial visit . Old history :   Patient in general good state of health has Noticed hoarse voice 4 weeks ago   Got treated with antibiotics for a week, but hoarseness persisted     She saw Dr. Liz Rajput and he ordered Ct/ usg neck   He found left vocal cord palsy on laryngoscopy     He ordered for FNA of thyroid mass and left supraclavicular LN  Which revealed PTC  Ct scan showed locally invasive metastatic tissues involvement       She underwent near total thyroidectomy with radical dissection of ipsilateral LN and central dissection of LN surgery  On May 8 2019 at [facility]            History reviewed. No pertinent past medical history.     Social History     Socioeconomic History    Marital status: UNKNOWN     Spouse name: Not on file    Number of children: Not on file    Years of education: Not on file    Highest education level: Not on file   Occupational History    Not on file   Social Needs    Financial resource strain: Not on file    Food insecurity:     Worry: Not on file     Inability: Not on file    Transportation needs:     Medical: Not on file     Non-medical: Not on file   Tobacco Use    Smoking status: Never Smoker    Smokeless tobacco: Never Used   Substance and Sexual Activity    Alcohol use: Not on file    Drug use: Not on file    Sexual activity: Not on file   Lifestyle    Physical activity:     Days per week: Not on file     Minutes per session: Not on file    Stress: Not on file   Relationships    Social connections:     Talks on phone: Not on file     Gets together: Not on file     Attends Hinduism service: Not on file     Active member of club or organization: Not on file     Attends meetings of clubs or organizations: Not on file     Relationship status: Not on file    Intimate partner violence:     Fear of current or ex partner: Not on file     Emotionally abused: Not on file     Physically abused: Not on file     Forced sexual activity: Not on file   Other Topics Concern    Not on file   Social History Narrative    Not on file       History reviewed. No pertinent family history. Review of Systems   Constitutional: Negative. HENT: Negative. Eyes: Negative. Respiratory: Negative. Cardiovascular: Negative. Gastrointestinal: Negative. Genitourinary: Negative. Musculoskeletal: Negative. Skin: Negative. Neurological: Negative. Endo/Heme/Allergies: Negative. Psychiatric/Behavioral: Negative. Physical Exam   Constitutional: She is oriented to person, place, and time. She appears well-developed and well-nourished. HENT:   Head: Normocephalic. Eyes: Pupils are equal, round, and reactive to light. Conjunctivae and EOM are normal.   Neck: Normal range of motion. Neck supple. Thyromegaly (recent surgery for thyroidectomy ) present. Cardiovascular: Normal rate and regular rhythm. Pulmonary/Chest: Effort normal and breath sounds normal.   Abdominal: Soft. Bowel sounds are normal.   Musculoskeletal: Normal range of motion. Neurological: She is alert and oriented to person, place, and time. She has normal reflexes. Skin: Skin is warm and dry. Psychiatric: She has a normal mood and affect.        April 22 2019 :  CT showed several low density nodules causing mass effect on left thyroid  4.1cm by 2.8 cm by 2.3 cm complex septated partially calcified mass   Several masses on left jugular vein , large supraclavivular mass 2.2 by 1.4 by 2.1 cm extending to the level of hyoid   Right lobe is normal        ASSESSMENT and PLAN    PTC METASTATIC, LOCALLY INVASIVE   S/P NEAR TOTAL THYROIDECTOMY  WITH LEFT SIDED RADICAL NECK DISSECTION     surg path : May 8 2019   Left lobe PTC 4 cm   Central neck 3/6 LN metastatic  Left lateral neck  4/9 LN metastatic   Right lobe  1 mm focus   Largest metastatic LN is 2.8 cm    AJCC  8th  pT2, pN1b   surg path   Pt 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 recurrent laryngeal nerve involved- it is therefore  PT4a, p N1b     It is STAGE IV     VLADISLAV  RISK STRATIFICATION : INTERMEDIATE for recurrence     WILL  Start preparation for  FRIAS ABLATION   Stopped CYTOMEL - 25 MCG  On may 28 2019   Obtaining labs today and will order the dose as TSH becomes elevated   Thus far , TG is at 2     She is on low iodine diet   Explained the course of management care and precautions to be followed with FRIAS ablation and whole body scan         HYPOTHYROIDISM :    Pt is symptomatic ,  Expected and provided info about it         HYPOCALCEMIA , POST SURGICAL : appears temporary as her iPTH improved and calcium levels have stabilized   She can be slowly withdrawn from calcium supplementation       Wheezing- could be from vocal cord palsy , but wanted to get her PFTS done   Requested Valente Joseph to follow up and he will see her tomorrow.         > 50 % of time is spent on counseling , time spent longer for counseling session  Patient voiced understanding her plan of care no wheezes/no rhonchi/respirations non-labored/no rales/clear to auscultation bilaterally

## 2020-02-27 DIAGNOSIS — C73 THYROID CANCER (HCC): ICD-10-CM

## 2020-02-27 DIAGNOSIS — E89.0 POSTOPERATIVE HYPOTHYROIDISM: Primary | ICD-10-CM

## 2020-03-11 ENCOUNTER — OFFICE VISIT (OUTPATIENT)
Dept: ENDOCRINOLOGY | Age: 70
End: 2020-03-11

## 2020-03-11 VITALS
WEIGHT: 143.4 LBS | DIASTOLIC BLOOD PRESSURE: 83 MMHG | HEART RATE: 72 BPM | RESPIRATION RATE: 16 BRPM | HEIGHT: 61 IN | OXYGEN SATURATION: 100 % | TEMPERATURE: 97.2 F | BODY MASS INDEX: 27.08 KG/M2 | SYSTOLIC BLOOD PRESSURE: 121 MMHG

## 2020-03-11 DIAGNOSIS — C73 THYROID CANCER (HCC): ICD-10-CM

## 2020-03-11 DIAGNOSIS — E89.0 POSTOPERATIVE HYPOTHYROIDISM: Primary | ICD-10-CM

## 2020-03-11 RX ORDER — LEVOTHYROXINE SODIUM 88 UG/1
88 TABLET ORAL
Qty: 30 TAB | Refills: 4 | Status: SHIPPED | OUTPATIENT
Start: 2020-03-11 | End: 2020-06-01

## 2020-03-11 NOTE — LETTER
3/11/20 Patient: Anders Cardozo YOB: 1950 Date of Visit: 3/11/2020 Patel Quiles MD 
[id number] [place] [address] VIA Facsimile: [phone] Dear Patel Quiles MD, Thank you for referring Ms. Audrey Grayson to [facility] for evaluation. My notes for this consultation are attached. If you have questions, please do not hesitate to call me. I look forward to following your patient along with you. Sincerely, Destiny Guidry MD

## 2020-03-11 NOTE — PROGRESS NOTES
Aline Dominguez is a 81 y.o. female here for   Chief Complaint   Patient presents with    Thyroid Problem       1. Have you been to the ER, urgent care clinic since your last visit? Hospitalized since your last visit? -no    2. Have you seen or consulted any other health care providers outside of the [address] [place] [address] since your last visit? Include any pap smears or colon screening. -PCP    Had colonoscopy in Nov 2019 and cataract removed bilateral Nov/Dec 2019

## 2020-03-11 NOTE — PATIENT INSTRUCTIONS
Take calcium 500 mg one pill twice   A   Day       ---------------------------------------------------------------------------------------------------------------      unithroid  88  mcg  A day,   BRAND  Every day ,  Stop  100 mcg dose   on empty stomach with water only, no other meds or food or drinks   For next half hour       Take any kind of vitamins, calcium, iron   Pills  4 hours later

## 2020-03-11 NOTE — PROGRESS NOTES
HISTORY OF PRESENT ILLNESS  Audrey Lemus is a 81 y.o. female. HPI  Patient is here for follow up  for management of Papillary  Thyroid cancer    From sept 2019     She is taking 100 mcg dose of synthroid a day   She was told to take 100 mcg 6 days a week and  Half pill on sundays   She still  Feels a bit Short of breath with exercise       Old history  :    Lost 5 lbs   Feels at times a bit anxious , feels warmth on palms and soles   Here to discuss labs     Old history :      She is here with her    Feels at times a bit anxious , feels warmth on palms and soles     Denies any palpitations  Weight is stable       Old history      Gained 2 lbs since last seen   Returned from vacation   On the phone, asked her to stop cytomel on may 28 2019     She feels tired and has slight wheeze on conversing   She is taking allegra tylenol PRN basis   In the interim,  I  Had  a counseling session  Soon after the initial visit . Old history :   Patient in general good state of health has Noticed hoarse voice 4 weeks ago   Got treated with antibiotics for a week, but hoarseness persisted     She saw Dr. Saundra Webb and he ordered Ct/ usg neck   He found left vocal cord palsy on laryngoscopy     He ordered for FNA of thyroid mass and left supraclavicular LN  Which revealed PTC  Ct scan showed locally invasive metastatic tissues involvement       She underwent near total thyroidectomy with radical dissection of ipsilateral LN and central dissection of LN surgery  On May 8 2019 at [facility]            No past medical history on file.     Social History     Socioeconomic History    Marital status:      Spouse name: Not on file    Number of children: Not on file    Years of education: Not on file    Highest education level: Not on file   Occupational History    Not on file   Social Needs    Financial resource strain: Not on file    Food insecurity     Worry: Not on file     Inability: Not on file   Gyros needs     Medical: Not on file     Non-medical: Not on file   Tobacco Use    Smoking status: Never Smoker    Smokeless tobacco: Never Used   Substance and Sexual Activity    Alcohol use: Yes     Comment: rarely    Drug use: Never    Sexual activity: Not on file   Lifestyle    Physical activity     Days per week: Not on file     Minutes per session: Not on file    Stress: Not on file   Relationships    Social connections     Talks on phone: Not on file     Gets together: Not on file     Attends Hinduism service: Not on file     Active member of club or organization: Not on file     Attends meetings of clubs or organizations: Not on file     Relationship status: Not on file    Intimate partner violence     Fear of current or ex partner: Not on file     Emotionally abused: Not on file     Physically abused: Not on file     Forced sexual activity: Not on file   Other Topics Concern    Not on file   Social History Narrative    Not on file       No family history on file. Review of Systems   Constitutional: Negative. HENT: Negative. Eyes: Negative. Respiratory: Negative. Cardiovascular: Negative. Gastrointestinal: Negative. Genitourinary: Negative. Musculoskeletal: Negative. Skin: Negative. Neurological: Negative. Endo/Heme/Allergies: Negative. Psychiatric/Behavioral: Negative. Physical Exam   Constitutional: She is oriented to person, place, and time. She appears well-developed and well-nourished. HENT:   Head: Normocephalic. Eyes: Pupils are equal, round, and reactive to light. Conjunctivae and EOM are normal.   Neck: Normal range of motion. Neck supple. (healed surgical scar for thyroidectomy ) present. Cardiovascular: Normal rate and regular rhythm. Pulmonary/Chest: Effort normal and breath sounds normal.   Abdominal: Soft. Bowel sounds are normal.   Musculoskeletal: Normal range of motion.    Neurological: She is alert and oriented to person, place, and time. She has normal reflexes. Skin: Skin is warm and dry. Psychiatric: She has a normal mood and affect. April 22 2019 :  CT showed several low density nodules causing mass effect on left thyroid  4.1cm by 2.8 cm by 2.3 cm complex septated partially calcified mass   Several masses on left jugular vein , large supraclavivular mass 2.2 by 1.4 by 2.1 cm extending to the level of hyoid   Right lobe is normal      ASSESSMENT and PLAN    PTC METASTATIC, LOCALLY INVASIVE   S/P NEAR TOTAL THYROIDECTOMY  WITH LEFT SIDED RADICAL NECK DISSECTION   surg path :  May 8 2019   Left lobe PTC 4 cm   Central neck 3/6 LN metastatic  Left lateral neck  4/9 LN metastatic   Right lobe  1 mm focus   Largest metastatic LN is 2.8 cm    AJCC  8th  pT2, pN1b   surg path   Pt 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 recurrent laryngeal nerve involved- it is therefore  PT4a, p N1b   It is STAGE IV     VLADISLAV  RISK STRATIFICATION : INTERMEDIATE for recurrence     S/p   FRIAS ABLATION   June 13 2019   WBS - localized in neck, no other spread     TG by [facility]  In June 2019  Was 8.9     And  TSH was 27   when the thyroid antibodies are negative  TG by [facility]   On aug 7 2019  is 4.4,  when TSH is suppressed to 0.028     ultrasound is negative  From sept 2019     TG by [facility] nov 2019-  0.1   TG  By [facility]  In feb 2020  -  0.3       HYPOTHYROIDISM :    On SYNTHRODI 125 MCG DOSE   TSH  IS SUPPRESSED AND it IS NEEDED FOR THYROID CANCER MANAGEMENT     Sept 2019  :   TSH is  0.006 , Suppressed on current dose of 125 mcg dose   Will do 106 mcg a day  ( by making her take 75 mcg unithrodi 10 pills  A week )   One pill every day but on Tue, Thursday and sat 2 pills  A day       Nov 2019 : asked her to take synthroid  100 mcg a day but on sundays to take extra 50 mcg a day  ( 750 mcg a week )    Dec 2019  :  <0.01 - continued on same doses as above     Jan 2019  : suppressed TSH  - asked patient to reduce weekly dose to 650 mcg a day  ( 100 mcg daily, but on sundays 50 mcg )  But patient stayed on  100 mcg a day which is 700 mcg a day     Feb 2019  :  TSH is  <0.01  , start on 88 mcg a day of synthroid a day       HYPOCALCEMIA , POST SURGICAL : resolved   She is on general calcium supplementation     Pre- diabetes : A1c is 5.7  %      > 50 % of time is spent on counseling   Patient voiced understanding her plan of care

## 2020-05-20 ENCOUNTER — TELEPHONE (OUTPATIENT)
Dept: ENDOCRINOLOGY | Age: 70
End: 2020-05-20

## 2020-05-20 DIAGNOSIS — E20.9 HYPOPARATHYROIDISM, UNSPECIFIED HYPOPARATHYROIDISM TYPE (HCC): ICD-10-CM

## 2020-05-20 DIAGNOSIS — E89.0 POSTOPERATIVE HYPOTHYROIDISM: Primary | ICD-10-CM

## 2020-05-20 DIAGNOSIS — E89.0 POSTOPERATIVE HYPOTHYROIDISM: ICD-10-CM

## 2020-05-20 DIAGNOSIS — C73 THYROID CANCER (HCC): ICD-10-CM

## 2020-05-20 DIAGNOSIS — R73.03 PRE-DIABETES: ICD-10-CM

## 2020-06-01 RX ORDER — LEVOTHYROXINE SODIUM 88 UG/1
TABLET ORAL
Qty: 90 TAB | Refills: 1 | Status: SHIPPED | OUTPATIENT
Start: 2020-06-01 | End: 2020-10-26 | Stop reason: SDUPTHER

## 2020-06-01 NOTE — TELEPHONE ENCOUNTER
Can you please ask her when she is planning to get labs - does she have the lab request ?   Is she all out of medication    I am avoiding early refill of 90 days        If she got lab slip - can she go for labs now if possible to [facility]

## 2020-06-15 ENCOUNTER — VIRTUAL VISIT (OUTPATIENT)
Dept: ENDOCRINOLOGY | Age: 70
End: 2020-06-15

## 2020-06-15 DIAGNOSIS — C73 THYROID CANCER (HCC): Primary | ICD-10-CM

## 2020-06-15 DIAGNOSIS — E89.0 POSTOPERATIVE HYPOTHYROIDISM: ICD-10-CM

## 2020-06-15 NOTE — PROGRESS NOTES
**THIS IS A VIRTUAL VISIT VIA AUDIO- VIDEO SYNCHRONOUS DISCUSSION. PATIENT AGREED TO HAVE THEIR CARE DELIVERED OVER A [employer] ME VIDEO VISIT IN PLACE OF THEIR REGULARLY SCHEDULED OFFICE VISIT**   Pt  is aware that this is a billable encounter and is responsible for copays/deductibles   Patient gave a verbal consent to proceed with virtual video visit   Patient is at home and I, the provider,  am at the office care diabetes and endocrinology      HISTORY OF PRESENT ILLNESS  Audrey Helm is a 81 y.o. female. HPI  Patient is here for follow up  for management of Papillary  Thyroid cancer    From  March 11 2020      She is now being compliant with taking synthroid 88 mcg dose daily   Not much symptomatic   No huffing and puffing   No burning of feet   Feels fine   Labs not done yet       Old history      She is taking 100 mcg dose of synthroid a day   She was told to take 100 mcg 6 days a week and  Half pill on sundays   She still  Feels a bit Short of breath with exercise       Old history  :    Lost 5 lbs   Feels at times a bit anxious , feels warmth on palms and soles   Here to discuss labs     Old history :      She is here with her    Feels at times a bit anxious , feels warmth on palms and soles     Denies any palpitations  Weight is stable       Old history      Gained 2 lbs since last seen   Returned from vacation   On the phone, asked her to stop cytomel on may 28 2019     She feels tired and has slight wheeze on conversing   She is taking allegra tylenol PRN basis   In the interim,  I  Had  a counseling session  Soon after the initial visit .         Old history :   Patient in general good state of health has Noticed hoarse voice 4 weeks ago   Got treated with antibiotics for a week, but hoarseness persisted     She saw Dr. Adelaida Cano and he ordered Ct/ usg neck   He found left vocal cord palsy on laryngoscopy     He ordered for FNA of thyroid mass and left supraclavicular LN  Which revealed PTC  Ct scan showed locally invasive metastatic tissues involvement       She underwent near total thyroidectomy with radical dissection of ipsilateral LN and central dissection of LN surgery  On May 8 2019 at [facility]            History reviewed. No pertinent past medical history. Social History     Socioeconomic History    Marital status:      Spouse name: Not on file    Number of children: Not on file    Years of education: Not on file    Highest education level: Not on file   Occupational History    Not on file   Social Needs    Financial resource strain: Not on file    Food insecurity     Worry: Not on file     Inability: Not on file    Transportation needs     Medical: Not on file     Non-medical: Not on file   Tobacco Use    Smoking status: Never Smoker    Smokeless tobacco: Never Used   Substance and Sexual Activity    Alcohol use: Yes     Comment: rarely    Drug use: Never    Sexual activity: Not on file   Lifestyle    Physical activity     Days per week: Not on file     Minutes per session: Not on file    Stress: Not on file   Relationships    Social connections     Talks on phone: Not on file     Gets together: Not on file     Attends Sikh service: Not on file     Active member of club or organization: Not on file     Attends meetings of clubs or organizations: Not on file     Relationship status: Not on file    Intimate partner violence     Fear of current or ex partner: Not on file     Emotionally abused: Not on file     Physically abused: Not on file     Forced sexual activity: Not on file   Other Topics Concern    Not on file   Social History Narrative    Not on file       History reviewed. No pertinent family history. Review of Systems   Constitutional: Negative. HENT: Negative. Eyes: Negative. Respiratory: Negative. Cardiovascular: Negative. Gastrointestinal: Negative. Genitourinary: Negative. Musculoskeletal: Negative. Skin: Negative. Neurological: Negative. Endo/Heme/Allergies: Negative. Psychiatric/Behavioral: Negative. Physical Exam   Constitutional: She is oriented to person, place, and time. She appears well-developed and well-nourished. HENT:   Head: Normocephalic. Eyes: Pupils are equal, round, and reactive to light. Conjunctivae and EOM are normal.   Neck: Normal range of motion. Neck supple. (healed surgical scar for thyroidectomy ) present. Cardiovascular: Normal rate and regular rhythm. Pulmonary/Chest: Effort normal and breath sounds normal.   Abdominal: Soft. Bowel sounds are normal.   Musculoskeletal: Normal range of motion. Neurological: She is alert and oriented to person, place, and time. She has normal reflexes. Skin: Skin is warm and dry. Psychiatric: She has a normal mood and affect. April 22 2019 :  CT showed several low density nodules causing mass effect on left thyroid  4.1cm by 2.8 cm by 2.3 cm complex septated partially calcified mass   Several masses on left jugular vein , large supraclavivular mass 2.2 by 1.4 by 2.1 cm extending to the level of hyoid   Right lobe is normal      ASSESSMENT and PLAN    1. THYROID CANCER  :  PTC METASTATIC, LOCALLY INVASIVE   S/P NEAR TOTAL THYROIDECTOMY  WITH LEFT SIDED RADICAL NECK DISSECTION   surg path :  May 8 2019   Left lobe PTC 4 cm   Central neck 3/6 LN metastatic  Left lateral neck  4/9 LN metastatic   Right lobe  1 mm focus   Largest metastatic LN is 2.8 cm    AJCC  8th  pT2, pN1b   surg path   Pt 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 recurrent laryngeal nerve involved- it is therefore  PT4a, p N1b   It is STAGE IV     VLADISLAV  RISK STRATIFICATION : INTERMEDIATE for recurrence     S/p   FRIAS ABLATION   June 13 2019   WBS - localized in neck, no other spread     TG by [facility]  In June 2019  Was 8.9     And  TSH was 27   when the thyroid antibodies are negative  TG by [facility]   On aug 7 2019  is 4.4,  when TSH is suppressed to 0.028     ultrasound is negative  From sept 2019 TG by [address] nov 2019-  0.1   TG  By [facility]  In feb 2020  -  0.3     JUNE 2020  :  [address] [facility] [address] DIAGNOSIS WITH PTC  ULTRASOUND FOR SURVEILLANCE  - ORDERED NOW   LABS PENDING       2.  HYPOTHYROIDISM :    TSH  IS SUPPRESSED AND  IS NEEDED FOR THYROID CANCER MANAGEMENT     Sept 2019  :   TSH is  0.006 , Suppressed on current dose of 125 mcg dose   Will do 106 mcg a day  ( by making her take 75 mcg unithrodi 10 pills  A week )   One pill every day but on Tue, Thursday and sat 2 pills  A day     Nov 2019 : asked her to take synthroid  100 mcg a day but on sundays to take extra 50 mcg a day  ( 750 mcg a week )    Dec 2019  :  <0.01 - continued on same doses as above     Jan 2019  : suppressed TSH  - asked patient to reduce weekly dose to 650 mcg a day  ( 100 mcg daily, but on sundays 50 mcg )  But patient stayed on  100 mcg a day which is 700 mcg a day     Feb 2019  :  TSH is  <0.006  , startED  on 88 mcg a day of uNIthroid a day   ON MARCH 11 2020 June 2020  : AWAIT [address] , FEELS A LOT BETTER       3. HYPOCALCEMIA , POST SURGICAL : resolved   She is on general calcium supplementation     4. Pre- diabetes : A1c is 5.7  %        Pursuant  To the Emergency declaration under the [address] and the [facility], [facility] waiver authority and the [facility], to reduce the patient's risk of exposure to  COVID-19 and provide continuity of care for an established patient.

## 2020-06-15 NOTE — PATIENT INSTRUCTIONS
Take calcium 500 mg one pill twice   A   Day  
 
 
--------------------------------------------------------------------------------------------------------------- 
 
 
unithroid  88  mcg  A day,   BRAND  Every day ,   
on empty stomach with water only, no other meds or food or drinks   For next half hour Take any kind of vitamins, calcium, iron   Pills  4 hours later

## 2020-06-15 NOTE — PROGRESS NOTES
1. Have you been to the ER, urgent care clinic since your last visit? No  Hospitalized since your last visit? No    2. Have you seen or consulted any other health care providers outside of the [address] [place] [address] since your last visit? Include any pap smears or colon screening.  No

## 2020-06-22 DIAGNOSIS — C73 THYROID CANCER (HCC): ICD-10-CM

## 2020-06-22 DIAGNOSIS — E89.0 POSTOPERATIVE HYPOTHYROIDISM: ICD-10-CM

## 2020-07-15 LAB
T4 FREE SERPL-MCNC: 1.5 NG/DL (ref 0.82–1.77)
THYROGLOB AB SERPL-ACNC: <1 IU/ML
THYROGLOB SERPL-MCNC: 0.3 NG/ML
THYROGLOB SERPL-MCNC: NORMAL NG/ML
TSH SERPL DL<=0.005 MIU/L-ACNC: 0.1 UIU/ML (ref 0.45–4.5)

## 2020-10-13 ENCOUNTER — HOSPITAL ENCOUNTER (OUTPATIENT)
Dept: ULTRASOUND IMAGING | Age: 70
Discharge: HOME | End: 2020-10-13
Attending: INTERNAL MEDICINE
Payer: COMMERCIAL

## 2020-10-13 DIAGNOSIS — C73 THYROID CANCER (HCC): ICD-10-CM

## 2020-10-13 PROCEDURE — 76536 US EXAM OF HEAD AND NECK: CPT

## 2020-10-19 LAB
ALBUMIN SERPL-MCNC: 3.8 G/DL (ref 3.5–5)
ALBUMIN/GLOB SERPL: 1.3 {RATIO} (ref 1.1–2.2)
ALP SERPL-CCNC: 111 U/L (ref 45–117)
ALT SERPL-CCNC: 31 U/L (ref 12–78)
ANION GAP SERPL CALC-SCNC: 8 MMOL/L (ref 5–15)
AST SERPL-CCNC: 24 U/L (ref 15–37)
BILIRUB SERPL-MCNC: 0.6 MG/DL (ref 0.2–1)
BUN SERPL-MCNC: 11 MG/DL (ref 6–20)
BUN/CREAT SERPL: 16 (ref 12–20)
CALCIUM SERPL-MCNC: 9 MG/DL (ref 8.5–10.1)
CHLORIDE SERPL-SCNC: 103 MMOL/L (ref 97–108)
CO2 SERPL-SCNC: 29 MMOL/L (ref 21–32)
CREAT SERPL-MCNC: 0.69 MG/DL (ref 0.55–1.02)
EST. AVERAGE GLUCOSE BLD GHB EST-MCNC: 123 MG/DL
GLOBULIN SER CALC-MCNC: 2.9 G/DL (ref 2–4)
GLUCOSE SERPL-MCNC: 108 MG/DL (ref 65–100)
HBA1C MFR BLD: 5.9 % (ref 4–5.6)
POTASSIUM SERPL-SCNC: 4.2 MMOL/L (ref 3.5–5.1)
PROT SERPL-MCNC: 6.7 G/DL (ref 6.4–8.2)
SODIUM SERPL-SCNC: 140 MMOL/L (ref 136–145)
T4 FREE SERPL-MCNC: 1.4 NG/DL (ref 0.8–1.5)
TSH SERPL DL<=0.05 MIU/L-ACNC: 0.11 UIU/ML (ref 0.36–3.74)

## 2020-10-22 LAB
ANTI-THYROGLOBULIN ANTIBODIES, 500556: <1 IU/ML
THYROGLOBULIN (ICMA), 500542: 0.5 NG/ML
THYROGLOBULIN (TG-RIA), 500002: NORMAL NG/ML

## 2020-10-26 ENCOUNTER — OFFICE VISIT (OUTPATIENT)
Dept: ENDOCRINOLOGY | Age: 70
End: 2020-10-26
Payer: COMMERCIAL

## 2020-10-26 VITALS
WEIGHT: 140.8 LBS | OXYGEN SATURATION: 98 % | DIASTOLIC BLOOD PRESSURE: 72 MMHG | RESPIRATION RATE: 18 BRPM | HEART RATE: 91 BPM | SYSTOLIC BLOOD PRESSURE: 113 MMHG | BODY MASS INDEX: 25.91 KG/M2 | TEMPERATURE: 98.1 F | HEIGHT: 62 IN

## 2020-10-26 DIAGNOSIS — E89.0 POSTOPERATIVE HYPOTHYROIDISM: ICD-10-CM

## 2020-10-26 DIAGNOSIS — R73.03 PRE-DIABETES: ICD-10-CM

## 2020-10-26 DIAGNOSIS — C73 THYROID CANCER (HCC): Primary | ICD-10-CM

## 2020-10-26 PROCEDURE — G8419 CALC BMI OUT NRM PARAM NOF/U: HCPCS | Performed by: INTERNAL MEDICINE

## 2020-10-26 PROCEDURE — 99214 OFFICE O/P EST MOD 30 MIN: CPT | Performed by: INTERNAL MEDICINE

## 2020-10-26 PROCEDURE — G8536 NO DOC ELDER MAL SCRN: HCPCS | Performed by: INTERNAL MEDICINE

## 2020-10-26 PROCEDURE — 3017F COLORECTAL CA SCREEN DOC REV: CPT | Performed by: INTERNAL MEDICINE

## 2020-10-26 PROCEDURE — G8400 PT W/DXA NO RESULTS DOC: HCPCS | Performed by: INTERNAL MEDICINE

## 2020-10-26 PROCEDURE — G8510 SCR DEP NEG, NO PLAN REQD: HCPCS | Performed by: INTERNAL MEDICINE

## 2020-10-26 PROCEDURE — 1101F PT FALLS ASSESS-DOCD LE1/YR: CPT | Performed by: INTERNAL MEDICINE

## 2020-10-26 PROCEDURE — G8427 DOCREV CUR MEDS BY ELIG CLIN: HCPCS | Performed by: INTERNAL MEDICINE

## 2020-10-26 PROCEDURE — 1090F PRES/ABSN URINE INCON ASSESS: CPT | Performed by: INTERNAL MEDICINE

## 2020-10-26 RX ORDER — LEVOTHYROXINE SODIUM 88 UG/1
TABLET ORAL
Qty: 90 TAB | Refills: 3 | Status: SHIPPED | OUTPATIENT
Start: 2020-10-26 | End: 2021-10-26 | Stop reason: SDUPTHER

## 2020-10-26 NOTE — PROGRESS NOTES
1. Have you been to the ER, urgent care clinic since your last visit?no  Hospitalized since your last visit? No    2. Have you seen or consulted any other health care providers outside of the [address] [place] [address] since your last visit? Include any pap smears or colon screening.  No  Wt Readings from Last 3 Encounters:   03/11/20 143 lb 6.4 oz (65 kg)   09/30/19 139 lb 12.8 oz (63.4 kg)   08/15/19 144 lb 1.6 oz (65.4 kg)     Temp Readings from Last 3 Encounters:   03/11/20 97.2 °F (36.2 °C) (Oral)   09/30/19 96.7 °F (35.9 °C) (Oral)   08/15/19 96.6 °F (35.9 °C) (Oral)     BP Readings from Last 3 Encounters:   03/11/20 121/83   09/30/19 117/72   08/15/19 126/80     Pulse Readings from Last 3 Encounters:   03/11/20 72   09/30/19 68   08/15/19 74     Lab Results   Component Value Date/Time    Hemoglobin A1c 5.9 (H) 10/19/2020 04:32 PM

## 2020-10-26 NOTE — PROGRESS NOTES
HISTORY OF PRESENT ILLNESS  Audrey Acosta is a 78 y.o. female. HPI  Patient is here for follow up  for management of Papillary  Thyroid cancer   And hypothyroidism    From  June 2020      C/o burning at the bottom of feet   She has sleep disturbance too   Gained  4 lbs       June 2020     She is now being compliant with taking synthroid 88 mcg dose daily   Not much symptomatic   No huffing and puffing   No burning of feet   Feels fine   Labs not done yet       Old history      She is taking 100 mcg dose of synthroid a day   She was told to take 100 mcg 6 days a week and  Half pill on sundays   She still  Feels a bit Short of breath with exercise     Old history      Gained 2 lbs since last seen   Returned from vacation   On the phone, asked her to stop cytomel on may 28 2019   She feels tired and has slight wheeze on conversing   She is taking allegra tylenol PRN basis   In the interim,  I  Had  a counseling session  Soon after the initial visit       Old history :   Patient in general good state of health has Noticed hoarse voice 4 weeks ago   Got treated with antibiotics for a week, but hoarseness persisted     She saw Dr. Sharon Brown and he ordered Ct/ usg neck   He found left vocal cord palsy on laryngoscopy     He ordered for FNA of thyroid mass and left supraclavicular LN  Which revealed PTC  Ct scan showed locally invasive metastatic tissues involvement       She underwent near total thyroidectomy with radical dissection of ipsilateral LN and central dissection of LN surgery  On May 8 2019 at [facility]            History reviewed. No pertinent past medical history.     Social History     Socioeconomic History    Marital status:      Spouse name: Not on file    Number of children: Not on file    Years of education: Not on file    Highest education level: Not on file   Occupational History    Not on file   Social Needs    Financial resource strain: Not on file    Food insecurity     Worry: Not on file Inability: Not on file    Transportation needs     Medical: Not on file     Non-medical: Not on file   Tobacco Use    Smoking status: Never Smoker    Smokeless tobacco: Never Used   Substance and Sexual Activity    Alcohol use: Yes     Comment: rarely    Drug use: Never    Sexual activity: Not on file   Lifestyle    Physical activity     Days per week: Not on file     Minutes per session: Not on file    Stress: Not on file   Relationships    Social connections     Talks on phone: Not on file     Gets together: Not on file     Attends Episcopalian service: Not on file     Active member of club or organization: Not on file     Attends meetings of clubs or organizations: Not on file     Relationship status: Not on file    Intimate partner violence     Fear of current or ex partner: Not on file     Emotionally abused: Not on file     Physically abused: Not on file     Forced sexual activity: Not on file   Other Topics Concern    Not on file   Social History Narrative    Not on file       History reviewed. No pertinent family history. Review of Systems   Constitutional: Negative. HENT: Negative. Eyes: Negative. Respiratory: Negative. Cardiovascular: Negative. Gastrointestinal: Negative. Genitourinary: Negative. Musculoskeletal: Negative. Skin: Negative. Neurological: Negative. Endo/Heme/Allergies: Negative. Psychiatric/Behavioral: Negative. Physical Exam   Constitutional: She is oriented to person, place, and time. She appears well-developed and well-nourished. HENT:   Head: Normocephalic. Eyes: Pupils are equal, round, and reactive to light. Conjunctivae and EOM are normal.   Neck: Normal range of motion. Neck supple. (healed surgical scar for thyroidectomy ) present. Cardiovascular: Normal rate and regular rhythm. Pulmonary/Chest: Effort normal and breath sounds normal.   Abdominal: Soft.  Bowel sounds are normal.   Musculoskeletal: Normal range of motion. Neurological: She is alert and oriented to person, place, and time. She has normal reflexes. Skin: Skin is warm and dry. Psychiatric: She has a normal mood and affect. April 22 2019 :  CT showed several low density nodules causing mass effect on left thyroid  4.1cm by 2.8 cm by 2.3 cm complex septated partially calcified mass   Several masses on left jugular vein , large supraclavivular mass 2.2 by 1.4 by 2.1 cm extending to the level of hyoid   Right lobe is normal      ASSESSMENT and PLAN    1. THYROID CANCER  :  PTC METASTATIC, LOCALLY INVASIVE   S/P NEAR TOTAL THYROIDECTOMY  WITH LEFT SIDED RADICAL NECK DISSECTION   surg path : May 8 2019   Left lobe PTC 4 cm   Central neck 3/6 LN metastatic  Left lateral neck  4/9 LN metastatic   Right lobe  1 mm focus   Largest metastatic LN is 2.8 cm    AJCC  8th  pT2, pN1b   surg path   Pt 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 recurrent laryngeal nerve involved- it is therefore  PT4a, p N1b   It is STAGE IV     VLADISLAV  RISK STRATIFICATION : INTERMEDIATE for recurrence     S/p   FRIAS ABLATION   June 13 2019   WBS - localized in neck, no other spread     TG by [facility]  In June 2019  Was 8.9     And  TSH was 27   when the thyroid antibodies are negative  TG by [facility]   On aug 7 2019  is 4.4,  when TSH is suppressed to 0.028     ultrasound is negative  From sept 2019     TG by [facility] nov 2019-  0.1   TG  By [facility]  In feb 2020  -  0.3     JUNE 2020  :  [age] YEAR SINCE DIAGNOSIS WITH PTC  ULTRASOUND FOR SURVEILLANCE  - [address]        2.   HYPOTHYROIDISM :    TSH  IS SUPPRESSED AND  IS NEEDED FOR THYROID CANCER MANAGEMENT     Sept 2019  :   TSH is  0.006 , Suppressed on current dose of 125 mcg dose   Will do 106 mcg a day  ( by making her take 75 mcg unithrodi 10 pills  A week )   One pill every day but on Tue, Thursday and sat 2 pills  A day     Nov 2019 : asked her to take synthroid  100 mcg a day but on sundays to take extra 50 mcg a day  ( 750 mcg a week )    Dec 2019  :  <0.01 - continued on same doses as above     Jan 2020   : suppressed TSH  < 0.001 - asked patient to reduce weekly dose to 650 mcg a WEEK   ( 100 mcg daily, but on sundays 50 mcg )  But patient stayed on  100 mcg a day which is 700 mcg a WEEK ( that is 100 mcg a day )     March 2020  : TSH is 0.016   - STARTED   on 88 mcg a day of uNIthroid a day   ON MARCH 11 2020 and I stopped 100 mcg dose      June 2020  : AWAIT LABS - ON UNITHRODI [address] , FEELS A LOT BETTER     July 2020  - labs    :   TSH  -  0.096       Oct 2020  :  TSH - 0.11     ON UNITHROID  88 MCG A DAY , she will stay on the same dose       4.   Pre- diabetes : A1c is 5.9  %  To do TLC     > 50 % of time is spent on counseling   Patient voiced understanding her plan of care

## 2020-10-26 NOTE — LETTER
10/26/20 Patient: Opal Naidu YOB: 1950 Date of Visit: 10/26/2020 Leida Flores MD 
[address] [place] [address] VIA In Basket Dear Leida Flores MD, Thank you for referring Ms. Audrey Grayson to [facility] for evaluation. My notes for this consultation are attached. If you have questions, please do not hesitate to call me. I look forward to following your patient along with you. Sincerely, Twyla Coombs MD

## 2021-04-06 ENCOUNTER — OFFICE VISIT (OUTPATIENT)
Dept: ENT CLINIC | Age: 71
End: 2021-04-06
Payer: COMMERCIAL

## 2021-04-06 VITALS
BODY MASS INDEX: 27.98 KG/M2 | WEIGHT: 138.8 LBS | OXYGEN SATURATION: 98 % | TEMPERATURE: 97.6 F | SYSTOLIC BLOOD PRESSURE: 110 MMHG | HEIGHT: 59 IN | DIASTOLIC BLOOD PRESSURE: 70 MMHG | HEART RATE: 81 BPM

## 2021-04-06 DIAGNOSIS — C73 PAPILLARY THYROID CARCINOMA (HCC): Primary | ICD-10-CM

## 2021-04-06 DIAGNOSIS — M79.18 MYOFASCIAL PAIN ON LEFT SIDE: ICD-10-CM

## 2021-04-06 DIAGNOSIS — E89.0 POSTOPERATIVE HYPOTHYROIDISM: ICD-10-CM

## 2021-04-06 DIAGNOSIS — H92.02 LEFT EAR PAIN: ICD-10-CM

## 2021-04-06 DIAGNOSIS — J38.01 VOCAL CORD PARALYSIS, UNILATERAL COMPLETE: ICD-10-CM

## 2021-04-06 PROCEDURE — 99214 OFFICE O/P EST MOD 30 MIN: CPT | Performed by: OTOLARYNGOLOGY

## 2021-04-06 NOTE — PROGRESS NOTES
04/06/2021-  Patient stated that he left ear has been bothering her. She stated that she has been swimming daily. Patient stated that she it feels like something moving in her left ear, not in the right ear. Patient very active, attends the [facility] daily.

## 2021-04-06 NOTE — PROGRESS NOTES
Subjective:    Audrey Grayson   78 y.o.   1950     Followup Visit    Location -thyroid    Quality -papillary thyroid cancer stage IV    Severity -moderate to severe    Duration -approximately 2 years    Timing -chronic    Context -following up today, patient is nearly 2 years out from total thyroidectomy and neck dissection for stage IV papillary thyroid cancer of the left thyroid with central neck and left lateral neck lymph nodes and invasion of the recurrent laryngeal nerve, simultaneous left vocal cord injection. She has been doing overall well has been following up with endocrinology last seen in October. At that time things were relatively stable. She has no new complaints today other than past couple of months some intermittent left ear symptoms no pain occasional feeling of something moving in the ear and also radiating to the temple. She does work out quite a bit has been swimming a lot, does complain of the left neck being a little stiff, no change in hearing    Modifying Features -none    Associated symptoms/signs -as above, does have a left-sided tooth that ultimately needs to be pulled      Review of Systems  Review of Systems   Constitutional: Negative for chills and fever. HENT: Positive for ear pain. Negative for hearing loss, nosebleeds and tinnitus. Eyes: Negative for blurred vision and double vision. Respiratory: Negative for cough, sputum production and shortness of breath. Cardiovascular: Negative for chest pain and palpitations. Gastrointestinal: Negative for heartburn, nausea and vomiting. Musculoskeletal: Positive for myalgias. Negative for joint pain and neck pain. Skin: Negative. Neurological: Negative for dizziness, speech change, weakness and headaches. Endo/Heme/Allergies: Negative for environmental allergies. Does not bruise/bleed easily. Psychiatric/Behavioral: Negative for memory loss. The patient does not have insomnia.           Past Medical History: Diagnosis Date    Arthritis      Prior to Admission medications    Medication Sig Start Date End Date Taking? Authorizing Provider   Unithroid 88 mcg tablet BMN  TAKE 1 TABLET BY MOUTH DAILY BEFORE BREAKFAST 10/26/20  Yes Tommy Wills MD   aspirin 81 mg chewable tablet Take 81 mg by mouth daily. Yes Provider, Historical   Cetirizine (ZYRTEC) 10 mg cap Take 1 Tab by mouth daily. Yes Provider, Historical   atorvastatin (LIPITOR) 20 mg tablet Take  by mouth daily. Yes Provider, Historical   calcium-cholecalciferol, d3, (CALCIUM 600 + D) 600-125 mg-unit tab Take  by mouth. Yes Provider, Historical   acetaminophen (TYLENOL) 325 mg tablet Take  by mouth every four (4) hours as needed for Pain. Provider, Historical            Objective:     Visit Vitals  /70 (BP 1 Location: Left upper arm, BP Patient Position: Sitting, BP Cuff Size: Adult)   Pulse 81   Temp 97.6 °F (36.4 °C)   Ht 4' 10.5\" (1.486 m)   Wt 138 lb 12.8 oz (63 kg)   SpO2 98%   BMI 28.52 kg/m²        Physical Exam  Vitals signs reviewed. Constitutional:       General: She is awake. She is not in acute distress. Appearance: Normal appearance. She is well-groomed and normal weight. HENT:      Head: Normocephalic and atraumatic. Jaw: There is normal jaw occlusion. No trismus, tenderness or malocclusion. Salivary Glands: Right salivary gland is not diffusely enlarged or tender. Left salivary gland is not diffusely enlarged or tender. Right Ear: Hearing, tympanic membrane, ear canal and external ear normal.      Left Ear: Hearing, tympanic membrane, ear canal and external ear normal.      Ears:      Moeller exam findings: does not lateralize. Right Rinne: AC > BC. Left Rinne: AC > BC. Nose: No nasal deformity, septal deviation or mucosal edema. Right Nostril: No epistaxis. Left Nostril: No epistaxis. Right Turbinates: Not enlarged, swollen or pale.       Left Turbinates: Not enlarged, swollen or pale.      Right Sinus: No maxillary sinus tenderness or frontal sinus tenderness. Left Sinus: No maxillary sinus tenderness or frontal sinus tenderness. Mouth/Throat:      Lips: Pink. No lesions. Mouth: Mucous membranes are moist. No oral lesions. Dentition: Dental caries present. Tongue: No lesions. Palate: No mass and lesions. Pharynx: Oropharynx is clear. Uvula midline. No oropharyngeal exudate or posterior oropharyngeal erythema. Tonsils: No tonsillar exudate. 0 on the right. 0 on the left. Comments: Last molar on left mandible is carious with obvious decay  Eyes:      General: Vision grossly intact. Extraocular Movements: Extraocular movements intact. Right eye: No nystagmus. Left eye: No nystagmus. Conjunctiva/sclera: Conjunctivae normal.      Pupils: Pupils are equal, round, and reactive to light. Neck:      Musculoskeletal: Muscular tenderness present. No edema or erythema. Thyroid: No thyroid mass, thyromegaly or thyroid tenderness. Trachea: Trachea normal. No tracheal tenderness or tracheal deviation. Comments: Left neck well-healed incision  No mass or adenopathy felt, no mass in the thyroid bed  There is some slight muscle stiffness and tenderness to the left trapezius and sternomastoid    Voice is overall stable, some mild pitch changes  Cardiovascular:      Rate and Rhythm: Normal rate and regular rhythm. Pulmonary:      Effort: Pulmonary effort is normal. No respiratory distress. Breath sounds: No stridor. Musculoskeletal: Normal range of motion. General: No swelling or tenderness. Lymphadenopathy:      Cervical: No cervical adenopathy. Skin:     General: Skin is warm and dry. Findings: No lesion or rash. Neurological:      General: No focal deficit present. Mental Status: She is alert and oriented to person, place, and time. Mental status is at baseline.       Cranial Nerves: Cranial nerves are intact. Coordination: Romberg sign negative. Gait: Gait is intact. Psychiatric:         Mood and Affect: Mood normal.         Behavior: Behavior normal. Behavior is cooperative. Assessment/Plan:     Encounter Diagnoses   Name Primary?  Papillary thyroid carcinoma (HCC) Yes    Vocal cord paralysis, unilateral complete     Left ear pain     Myofascial pain on left side     Postoperative hypothyroidism      Papillary thyroid carcinoma -looks to be doing well, continues follow-up with endocrinology, clinically CLYDE today. Will follow up on her upcoming labs and/or imaging. Revisit with me in 6 months for this. Vocal paralysis -appears to be still doing stable after her vocal cord injection. Likely has some compensation as well. Her left ear discomfort is not otologic her ear exam is clear. I do not have any concerns that is coming from any relationship to her previous malignancy. She does have a carious left lower mandibular molar which can cause referred ear symptoms also she has been swimming quite a bit and does have stiffness in the left neck musculature and I recommended using heat and massage for this. She will contact me if the ear symptoms worsen. No orders of the defined types were placed in this encounter. Follow-up and Dispositions    · Return in about 6 months (around 10/6/2021). Musa Huggins MD, [address] [facility]  [facility] [facility] [address] [facility] [address]  [facility] [phone]. [phone]

## 2021-04-06 NOTE — LETTER
4/6/2021 Patient: Ely Yepez YOB: 1950 Date of Visit: 4/6/2021 Seth Lesch, MD 
[address] [facility] [address] Via Fax: [phone] Lilia Horner MD 
[address] [address] Via In [employer] Dear Seth Lesch, MD Lawana Bent, MD, Thank you for referring Ms. Audrey Grayson to [facility] EAR, NOSE, THROAT AND ALLERGY CARE for evaluation. My notes for this consultation are attached. If you have questions, please do not hesitate to call me. I look forward to following your patient along with you. Sincerely, Marquez Santos MD

## 2021-04-27 ENCOUNTER — OFFICE VISIT (OUTPATIENT)
Dept: ENDOCRINOLOGY | Age: 71
End: 2021-04-27
Payer: COMMERCIAL

## 2021-04-27 VITALS
HEART RATE: 72 BPM | BODY MASS INDEX: 27.82 KG/M2 | DIASTOLIC BLOOD PRESSURE: 68 MMHG | OXYGEN SATURATION: 98 % | SYSTOLIC BLOOD PRESSURE: 113 MMHG | HEIGHT: 59 IN | RESPIRATION RATE: 18 BRPM | WEIGHT: 138 LBS | TEMPERATURE: 96.6 F

## 2021-04-27 DIAGNOSIS — C73 THYROID CANCER (HCC): Primary | ICD-10-CM

## 2021-04-27 PROCEDURE — 99214 OFFICE O/P EST MOD 30 MIN: CPT | Performed by: INTERNAL MEDICINE

## 2021-04-27 NOTE — PATIENT INSTRUCTIONS
SPECIFIC INSTRUCTIONS BELOW       THYROGEN  Prior auth to be done       --------------------------------------------------    Take calcium 500 mg one pill twice   A   Day       ---------------------------------------------------------------------------------------------------------------      unithroid  88  mcg  A day,   BRAND  Every day ,    on empty stomach with water only, no other meds or food or drinks   For next half hour       Take any kind of vitamins, calcium, iron   Pills  4 hours later      PAY ATTENTION TO THESE GENERAL INSTRUCTIONS     -ANY tests other than blood work, which you opt to do  outside [facility] imaging facilities, you are responsible for prior authorizations if  required   - HEALTH MAINTENANCE IS NOT GOING TO BE UP TO DATE ON YOUR AVS- PLEASE IGNORE   - YOUR MED LIST IS NOT UP TO DATE AS SOME CHANGES ARE BEING MADE AFTER THE VISIT - FOLLOW SPECIFIC INSTRUCTIONS  ABOVE     Results     *Normal results will not be notified by a phone call starting January 1 2021   *If you have an upcoming visit, the results will be discussed at the visit   *Please sign up for MY CHART if you want access to your lab and test results  *Abnormal results which require immediate attention will be notified by phone call   *Abnormal results which do not require immediate assistance will be notified in 1-2 weeks       Refills    -    have your pharmacy send us a refill request  Phone calls  -  Allow  24 hrs.  for non-urgent calls to be returned  Prior authorization - It may take 2-4 weeks to process  Forms  -  FMLA, DMV etc., will take up to 2 weeks to process  Cancellations - please notify the office 2 days in advance   Samples  - will only be dispensed at visits     --------------------------------------------------------------------------------------------

## 2021-04-27 NOTE — LETTER
4/27/2021 Patient: Cherie Asher YOB: 1950 Date of Visit: 4/27/2021 Uche Womack MD 
[address] ,[address] Unit [facility] [address] Via Fax: [phone] Dear Uche Womack MD, Thank you for referring Ms. Audrey Grayson to [facility] DIABETES & ENDOCRINOLOGY for evaluation. My notes for this consultation are attached. If you have questions, please do not hesitate to call me. I look forward to following your patient along with you. Sincerely, Marissa Zamarripa MD

## 2021-04-27 NOTE — PROGRESS NOTES
HISTORY OF PRESENT ILLNESS  Audrey Mckeon is a 78 y.o. female. HPI  Patient is here for follow up  for management of Papillary  Thyroid cancer   And hypothyroidism    From  October 2020    Lost 2 lbs   She feels good   Compliant with medication synthrodi       Oct 2020        C/o burning at the bottom of feet   She has sleep disturbance too   Gained  4 lbs       June 2020     She is now being compliant with taking synthroid 88 mcg dose daily   Not much symptomatic   No huffing and puffing   No burning of feet   Feels fine   Labs not done yet       Old history      She is taking 100 mcg dose of synthroid a day   She was told to take 100 mcg 6 days a week and  Half pill on sundays   She still  Feels a bit Short of breath with exercise     Old history :   Patient in general good state of health has Noticed hoarse voice 4 weeks ago   Got treated with antibiotics for a week, but hoarseness persisted   She saw Dr. Ammon Andrade and he ordered Ct/ usg neck   He found left vocal cord palsy on laryngoscopy   He ordered for FNA of thyroid mass and left supraclavicular LN  Which revealed PTC  Ct scan showed locally invasive metastatic tissues involvement   She underwent near total thyroidectomy with radical dissection of ipsilateral LN and central dissection of LN surgery  On May 8 2019 at [facility]        Review of Systems   Constitutional: Negative. Psychiatric/Behavioral: Negative. Physical Exam   Constitutional: She is oriented to person, place, and time. She appears well-developed and well-nourished. Neck: Normal range of motion. Neck supple. (healed surgical scar for thyroidectomy ) present. Psychiatric: She has a normal mood and affect.        April 22 2019 :  CT showed several low density nodules causing mass effect on left thyroid  4.1cm by 2.8 cm by 2.3 cm complex septated partially calcified mass   Several masses on left jugular vein , large supraclavivular mass 2.2 by 1.4 by 2.1 cm extending to the level of hyoid   Right lobe is normal      ASSESSMENT and PLAN    1. 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- it is therefore  PT4a, p N1b ; It is STAGE IV   VLADISLAV  RISK STRATIFICATION : INTERMEDIATE for recurrence     S/p   FRIAS ABLATION   June 13 2019   WBS - localized in neck, no other spread     TG by [facility]  In June 2019  Was 8.9     And  TSH was 27   when the thyroid antibodies are negative  TG by [facility]   On aug 7 2019  is 4.4,  when TSH is suppressed to 0.028     ultrasound is negative  From sept 2019     TG by [facility] nov 2019-  0.1   TG  By [facility]  In feb 2020  -  0.3   JUNE 2020  :  [address] [facility] [facility] [address] DIAGNOSIS WITH PTC  ULTRASOUND FOR SURVEILLANCE   NEGATIVE FOR RECURRENCE      April 2021  :  F/u usg in oct 2021 for surveillance   Will plan for thyrogen stimulated TG marker check           2. HYPOTHYROIDISM :    TSH  IS SUPPRESSED AND  IS NEEDED FOR THYROID CANCER MANAGEMENT   Deleted notes   Jan 2020   : suppressed TSH  < 0.001 - asked patient to reduce weekly dose to 650 mcg a WEEK   ( 100 mcg daily, but on sundays 50 mcg )  But patient stayed on  100 mcg a day which is 700 mcg a WEEK ( that is 100 mcg a day )   March 2020  : TSH is 0.016   - STARTED   on 88 mcg a day of uNIthroid a day   ON MARCH 11 2020 and I stopped 100 mcg dose    June 2020  : AWAIT LABS - ON UNITHRODI [address] , FEELS A LOT BETTER   July 2020  - labs    :   TSH  -  0.096     Oct 2020  :  TSH - 0.11     ON UNITHROID  88 MCG A DAY , she will stay on the same dose   April 2021  :  TSH is 0.02, went lower - while on the same dose - FLUCTUANT . Continuing on the same dose       4.   Pre- diabetes : A1c is 5.7 %    From April 2021    comapred   To    5.9  %  To do TLC       Reviewed results with patient and discussed the labs being ordered today/bnv  Patient voiced understanding of plan of care

## 2021-04-27 NOTE — PROGRESS NOTES
Room 1    Identified pt with two pt identifiers(name and ). Reviewed record in preparation for visit and have obtained necessary documentation. All patient medications has been reviewed. Chief Complaint   Patient presents with    Thyroid Problem       3 most recent PHQ Screens 2021   PHQ Not Done -   Little interest or pleasure in doing things Not at all   Feeling down, depressed, irritable, or hopeless Not at all   Total Score PHQ 2 0     Abuse Screening Questionnaire 2021   Do you ever feel afraid of your partner? N   Are you in a relationship with someone who physically or mentally threatens you? N   Is it safe for you to go home? Y       Health Maintenance Review: Patient reminded of \"due or due soon\" health maintenance. I have asked the patient to contact his/her primary care provider (PCP) for follow-up on his/her health maintenance. Vitals:    21 1050   BP: 113/68   Pulse: 72   Resp: 18   Temp: (!) 96.6 °F (35.9 °C)   TempSrc: Oral   SpO2: 98%   Weight: 138 lb (62.6 kg)   Height: 4' 10.5\" (1.486 m)   PainSc:   0 - No pain       Wt Readings from Last 3 Encounters:   21 138 lb (62.6 kg)   21 138 lb 12.8 oz (63 kg)   10/26/20 140 lb 12.8 oz (63.9 kg)     Temp Readings from Last 3 Encounters:   21 (!) 96.6 °F (35.9 °C) (Oral)   21 97.6 °F (36.4 °C)   10/26/20 98.1 °F (36.7 °C) (Oral)     BP Readings from Last 3 Encounters:   21 113/68   21 110/70   10/26/20 113/72     Pulse Readings from Last 3 Encounters:   21 72   21 81   10/26/20 91       Lab Results   Component Value Date/Time    Hemoglobin A1c 5.7 (H) 2021 12:00 AM       Coordination of Care Questionnaire:   1) Have you been to an emergency room, urgent care, or hospitalized since your last visit?   no       2. Have seen or consulted any other health care provider since your last visit?  NO    Advance Care Planning:   End of Life Planning: has NO advanced directive - not interested in additional information    Patient is accompanied by self I have received verbal consent from [facility] to discuss any/all medical information while they are present in the room.

## 2021-06-03 ENCOUNTER — DOCUMENTATION ONLY (OUTPATIENT)
Dept: ENDOCRINOLOGY | Age: 71
End: 2021-06-03

## 2021-06-03 DIAGNOSIS — C73 THYROID CANCER (HCC): Primary | ICD-10-CM

## 2021-06-03 NOTE — PROGRESS NOTES
Thyrogen form/demographic sheet and insurance info faxed to [place] at [phone]. Call placed to inform patient that Procedure planning has been started; patient unavailable and voicemail full. Call placed to Kriss Maciel at [phone] (unavailble) Left voicemail for return call.

## 2021-06-04 ENCOUNTER — DOCUMENTATION ONLY (OUTPATIENT)
Dept: ENDOCRINOLOGY | Age: 71
End: 2021-06-04

## 2021-06-04 NOTE — PROGRESS NOTES
Fax received from Infusion center stating they were unable to reach patient to schedule Thyrogen procedure. Call placed to patient, patient given contact info [phone]. Patient states she will call Infusion center to have procedure scheduled. Patient states she spoke with MD Sil Hutchinson regarding shots she is to receive before procedure is done.

## 2021-06-10 NOTE — TELEPHONE ENCOUNTER
Patient has texted me saying that she got  June 14th and June 15th dates for Thyrogen at the hospital     I was telling you that we still have to coordinate the FRIAS oral small dose on 16th Wednesday   A GAP day   On Friday  - she goes for  whole body scan- post thyrogen     These tests I have to order and you can confirm with LILIANA at nuclear med dept  if they got it     Where is she getting the thyrogen shots   The nuclear med treatments are  always at [address] [place] [address] Dr. Belem MD

## 2021-06-10 NOTE — TELEPHONE ENCOUNTER
Attempt to contact Maribel Guadalupe at [phone], unavailable for today, left voicemail for return call. . Will attempt to contact tomorrow morning.

## 2021-06-11 DIAGNOSIS — C73 THYROID CANCER (HCC): Primary | ICD-10-CM

## 2021-06-11 NOTE — TELEPHONE ENCOUNTER
Call placed to [facility]. Dates have been confirmed, First date is Monday June 14th at 1 and Tuesday June 15th at 830. Please order test so that I may confirm with Eben Chow [phone] that they have been received.

## 2021-06-14 ENCOUNTER — HOSPITAL ENCOUNTER (OUTPATIENT)
Dept: INFUSION THERAPY | Age: 71
Discharge: HOME | End: 2021-06-14
Payer: COMMERCIAL

## 2021-06-14 VITALS
TEMPERATURE: 97.3 F | RESPIRATION RATE: 18 BRPM | HEART RATE: 71 BPM | SYSTOLIC BLOOD PRESSURE: 145 MMHG | DIASTOLIC BLOOD PRESSURE: 87 MMHG

## 2021-06-14 DIAGNOSIS — C73 THYROID CANCER (HCC): Primary | ICD-10-CM

## 2021-06-14 PROCEDURE — 96372 THER/PROPH/DIAG INJ SC/IM: CPT

## 2021-06-14 PROCEDURE — 74011000250 HC RX REV CODE- 250: Performed by: INTERNAL MEDICINE

## 2021-06-14 PROCEDURE — 74011250636 HC RX REV CODE- 250/636: Performed by: INTERNAL MEDICINE

## 2021-06-14 RX ADMIN — THYROTROPIN ALFA 0.9 MG: 0.9 INJECTION, POWDER, FOR SOLUTION INTRAMUSCULAR at 08:56

## 2021-06-14 NOTE — PROGRESS NOTES
[facility] Progress Note    Date: June 14, 2021        0815: Pt arrived ambulatory to [facility] for Thyrogen Dose 1 of 2 in stable condition. Assessment completed. No new concerns voiced. Confirmed with patient the schedule of upcoming scans. Patient denies any symptoms of COVID-19, including SOB, coughing, fever, or generally not feeling well. Patient denies any recent exposure to COVID-19. Patient denies any recent contact with family or friends that have a pending COVID-19 test.      Patient Vitals for the past 12 hrs:   Temp Pulse Resp BP   06/14/21 0817 97.3 °F (36.3 °C) 71 18 (!) 145/87       Medications Administered     thyrotropin marizol (THYROGEN) 0.9 mg in sterile water (preservative free) 1.2 mL injection     Admin Date  06/14/2021 Action  Given Dose  0.9 mg Route  IntraMUSCular Administered By  Pipe Staples              Given IM to left gluteal muscle    0900: Tolerated treatment well, no adverse reactions noted. D/Cd from [facility] ambulatory and in no distress. Patient is aware of next scheduled [facility] appointment on 6/15/21. Future Appointments   Date Time Provider Murray Reece   6/15/2021  7:30 AM [facility]    6/16/2021  1:00 PM [facility] NM INJ RM 1 [address] [place] [address] [facility]   6/18/2021  1:00 PM [facility] NM RM 3 [facility]   7/21/2021 11:15 AM SPEC CDE LAB CDE BS AMB   10/6/2021 10:40 AM Jorge A Garica MD REP BS AMB   10/6/2021 11:30 AM [facility] 2 [facility]   10/20/2021 10:30 AM SPEC CDE LAB CDE BS AMB   10/27/2021 11:15 AM Clyde Forrest MD CDE BS AMB           Farooq Murillo RN  June 14, 2021

## 2021-06-15 ENCOUNTER — DOCUMENTATION ONLY (OUTPATIENT)
Dept: ENDOCRINOLOGY | Age: 71
End: 2021-06-15

## 2021-06-15 ENCOUNTER — HOSPITAL ENCOUNTER (OUTPATIENT)
Dept: INFUSION THERAPY | Age: 71
Discharge: HOME | End: 2021-06-15
Payer: COMMERCIAL

## 2021-06-15 VITALS
SYSTOLIC BLOOD PRESSURE: 133 MMHG | HEART RATE: 66 BPM | RESPIRATION RATE: 18 BRPM | TEMPERATURE: 97.1 F | DIASTOLIC BLOOD PRESSURE: 87 MMHG

## 2021-06-15 PROCEDURE — 96372 THER/PROPH/DIAG INJ SC/IM: CPT

## 2021-06-15 PROCEDURE — 74011250636 HC RX REV CODE- 250/636: Performed by: INTERNAL MEDICINE

## 2021-06-15 PROCEDURE — 74011000250 HC RX REV CODE- 250: Performed by: INTERNAL MEDICINE

## 2021-06-15 RX ADMIN — THYROTROPIN ALFA 0.9 MG: 0.9 INJECTION, POWDER, FOR SOLUTION INTRAMUSCULAR at 07:51

## 2021-06-15 NOTE — PROGRESS NOTES
[facility] Short Visit Progress Note    4327 Pt admit to [place] for Thyrogen Dose 2 of 2 ambulatory in stable condition. Assessment completed. No new concerns voiced. Visit Vitals  /87 (BP 1 Location: Left upper arm, BP Patient Position: At rest)   Pulse 66   Temp 97.1 °F (36.2 °C)   Resp 18       Medications:  Medications Administered     thyrotropin marizol (THYROGEN) 0.9 mg in sterile water (preservative free) 1.2 mL injection     Admin Date  06/15/2021 Action  Given Dose  0.9 mg Route  IntraMUSCular Administered By  Pam Mendenhall RN              Injection given IM in right glute    0800 Pt tolerated treatment well. D/c home ambulatory in no distress. Pt has no further appts. Naty Gallegos

## 2021-06-16 ENCOUNTER — HOSPITAL ENCOUNTER (OUTPATIENT)
Dept: NUCLEAR MEDICINE | Age: 71
Discharge: HOME | End: 2021-06-16
Attending: INTERNAL MEDICINE
Payer: COMMERCIAL

## 2021-06-16 DIAGNOSIS — C73 THYROID CANCER (HCC): ICD-10-CM

## 2021-06-16 PROCEDURE — A9528 IODINE I-131 IODIDE CAP, DX: HCPCS

## 2021-06-16 RX ORDER — SODIUM IODIDE I 131 100 MCI/1
3.8 CAPSULE ORAL ONCE
Status: COMPLETED | OUTPATIENT
Start: 2021-06-16 | End: 2021-06-16

## 2021-06-16 RX ADMIN — SODIUM IODIDE I 131 3.8 MILLICURIE: 100 CAPSULE ORAL at 13:07

## 2021-06-16 NOTE — PROGRESS NOTES
I ordered labs on her for Friday June 18 2021   They are non fasting and schedule her around 10 30 am to 11 am  I informed her      Vilma King MD

## 2021-06-18 ENCOUNTER — HOSPITAL ENCOUNTER (OUTPATIENT)
Dept: NUCLEAR MEDICINE | Age: 71
Discharge: HOME | End: 2021-06-18
Attending: INTERNAL MEDICINE

## 2021-06-19 LAB — TSH SERPL DL<=0.005 MIU/L-ACNC: 21 UIU/ML (ref 0.45–4.5)

## 2021-06-22 LAB
THYROGLOB AB SERPL-ACNC: <1 IU/ML
THYROGLOB SERPL-MCNC: 5.6 NG/ML
THYROGLOB SERPL-MCNC: NORMAL NG/ML

## 2021-07-25 LAB
THYROGLOB AB SERPL-ACNC: <1 IU/ML
THYROGLOB SERPL-MCNC: 0.4 NG/ML
THYROGLOB SERPL-MCNC: NORMAL NG/ML
TSH SERPL DL<=0.005 MIU/L-ACNC: 0.12 UIU/ML (ref 0.45–4.5)

## 2021-10-06 ENCOUNTER — HOSPITAL ENCOUNTER (OUTPATIENT)
Dept: ULTRASOUND IMAGING | Age: 71
Discharge: HOME | End: 2021-10-06
Attending: INTERNAL MEDICINE
Payer: COMMERCIAL

## 2021-10-06 DIAGNOSIS — C73 THYROID CANCER (HCC): ICD-10-CM

## 2021-10-06 PROCEDURE — 76536 US EXAM OF HEAD AND NECK: CPT

## 2021-10-22 LAB
T4 FREE SERPL-MCNC: 1.64 NG/DL (ref 0.82–1.77)
THYROGLOB AB SERPL-ACNC: <1 IU/ML
THYROGLOB SERPL-MCNC: 0.5 NG/ML
THYROGLOB SERPL-MCNC: NORMAL NG/ML
TSH SERPL DL<=0.005 MIU/L-ACNC: 0.04 UIU/ML (ref 0.45–4.5)

## 2021-10-26 ENCOUNTER — OFFICE VISIT (OUTPATIENT)
Dept: ENDOCRINOLOGY | Age: 71
End: 2021-10-26
Payer: COMMERCIAL

## 2021-10-26 VITALS
WEIGHT: 141 LBS | DIASTOLIC BLOOD PRESSURE: 67 MMHG | HEIGHT: 59 IN | TEMPERATURE: 97 F | OXYGEN SATURATION: 97 % | HEART RATE: 98 BPM | BODY MASS INDEX: 28.43 KG/M2 | RESPIRATION RATE: 18 BRPM | SYSTOLIC BLOOD PRESSURE: 114 MMHG

## 2021-10-26 DIAGNOSIS — C73 THYROID CANCER (HCC): Primary | ICD-10-CM

## 2021-10-26 DIAGNOSIS — R73.03 PRE-DIABETES: ICD-10-CM

## 2021-10-26 DIAGNOSIS — E89.0 POSTOPERATIVE HYPOTHYROIDISM: ICD-10-CM

## 2021-10-26 DIAGNOSIS — M81.0 SENILE OSTEOPOROSIS: ICD-10-CM

## 2021-10-26 PROCEDURE — 99215 OFFICE O/P EST HI 40 MIN: CPT | Performed by: INTERNAL MEDICINE

## 2021-10-26 RX ORDER — LEVOTHYROXINE SODIUM 88 UG/1
TABLET ORAL
Qty: 90 TABLET | Refills: 3 | Status: SHIPPED | OUTPATIENT
Start: 2021-10-26 | End: 2022-04-26 | Stop reason: SDUPTHER

## 2021-10-26 NOTE — PROGRESS NOTES
1. Have you been to the ER, urgent care clinic since your last visit? No  Hospitalized since your last visit? No    2. Have you seen or consulted any other health care providers outside of the [address] [place] [address] since your last visit? Include any pap smears or colon screening.  No     Wt Readings from Last 3 Encounters:   10/26/21 141 lb (64 kg)   04/27/21 138 lb (62.6 kg)   04/06/21 138 lb 12.8 oz (63 kg)     Temp Readings from Last 3 Encounters:   10/26/21 97 °F (36.1 °C) (Temporal)   06/15/21 97.1 °F (36.2 °C)   06/14/21 97.3 °F (36.3 °C)     BP Readings from Last 3 Encounters:   10/26/21 114/67   06/15/21 133/87   06/14/21 (!) 145/87     Pulse Readings from Last 3 Encounters:   10/26/21 98   06/15/21 66   06/14/21 71

## 2021-10-26 NOTE — LETTER
10/26/2021    Patient: Merced Dhaliwal   YOB: 1950   Date of Visit: 10/26/2021     Mable Mars MD  [address] [facility]  [address] [facility] [facility] [address]  Via Fax: [phone]    Dear Mable Mars MD,      Thank you for referring Ms. Audrey Grayson to [facility] DIABETES & ENDOCRINOLOGY for evaluation. My notes for this consultation are attached. If you have questions, please do not hesitate to call me. I look forward to following your patient along with you.       Sincerely,    Felicitas Pickett MD

## 2021-10-26 NOTE — PATIENT INSTRUCTIONS
SPECIFIC INSTRUCTIONS BELOW     Take calcium 500 mg one pill twice   A   Day       ---------------------------------------------------------------------------------------------------------------      unithroid  88  mcg  A day,   BRAND  Every day ,    on empty stomach with water only, no other meds or food or drinks   For next half hour     Take any kind of vitamins, calcium, iron   Pills  4 hours later      -------------PAY ATTENTION TO THESE GENERAL INSTRUCTIONS -----------------      - The medications prescribed at this visit will not be available at pharmacy until 6 pm       - YOUR MED LIST IS NOT UP TO DATE AS SOME CHANGES ARE BEING MADE AFTER THE VISIT - FOLLOW SPECIFIC INSTRUCTIONS  ABOVE     -ANY tests other than blood work, which you opt to do  outside the  [facility] imaging facilities, you are responsible for prior authorizations if  required    - [address] UP TO DATE ON YOUR AVS- PLEASE IGNORE     Results     *Normal results will not be notified by a phone call starting January 1 2021   *If you have an upcoming visit, the results will be discussed at the visit   *Please sign up for MY CHART if you want access to your lab and test results  *Abnormal results which require immediate attention will be notified by phone call   *Abnormal results which do not require immediate assistance will be notified in 1-2 weeks       Refills    -    have your pharmacy send us a refill request . Refills are done max for one year and a visit is a must before refills are extended    Follow up appointments -  highly encourage you to make it when you are checking out. We can accommodate you into the schedule based on your clinical situation, but not for extending refills beyond a year. Labs are important to give refills and is important to get labs before the visit     Phone calls  -  Allow  24 hrs.  for non-urgent calls to be returned  Prior authorization - It may take 2-4 weeks to process  Forms -  FMLA, DMV etc., will take up to 2 weeks to process  Cancellations - please notify the office 2 days in advance   Samples  - will only be dispensed at visits       If not showing for the appointments and cancelling appointments within 24 hours are kept track of and three  of such situations in  two consecutive years will likely be considered for termination from the practice    -------------------------------------------------------------------------------------------------------------------

## 2021-10-27 NOTE — PROGRESS NOTES
HISTORY OF PRESENT ILLNESS  Audrey Simms is a 73 y.o. female. HPI  Patient is here for follow up  for management of Papillary  Thyroid cancer   And hypothyroidism    From  April 2021     Gained 2 lbs  She is compliant with unithroid       April 2021     Lost 2 lbs   She feels good   Compliant with medication synthrodi       Oct 2020      C/o burning at the bottom of feet   She has sleep disturbance too   Gained  4 lbs     Old history :   Patient in general good state of health has Noticed hoarse voice 4 weeks ago   Got treated with antibiotics for a week, but hoarseness persisted   She saw Dr. Thalia Fisher and he ordered Ct/ usg neck   He found left vocal cord palsy on laryngoscopy   He ordered for FNA of thyroid mass and left supraclavicular LN  Which revealed PTC  Ct scan showed locally invasive metastatic tissues involvement   She underwent near total thyroidectomy with radical dissection of ipsilateral LN and central dissection of LN surgery  On May 8 2019 at [facility]        Review of Systems   Constitutional: Negative. Psychiatric/Behavioral: Negative. Physical Exam   Constitutional: She is oriented to person, place, and time. She appears well-developed and well-nourished. Neck: Normal range of motion. Neck supple. (healed surgical scar for thyroidectomy ) present. Psychiatric: She has a normal mood and affect. April 22 2019 :  CT showed several low density nodules causing mass effect on left thyroid  4.1cm by 2.8 cm by 2.3 cm complex septated partially calcified mass . Several masses on left jugular vein , large supraclavivular mass 2.2 by 1.4 by 2.1 cm extending to the level of hyoid   Right lobe is normal      ASSESSMENT and PLAN    1. 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- it is therefore  PT4a, p N1b ; It is STAGE IV   VLADISLAV  RISK STRATIFICATION : INTERMEDIATE for recurrence     S/p   FRIAS ABLATION   June 13 2019   WBS - localized in neck, no other spread   TG by [facility]  In June 2019  Was 8.9     And  TSH was 27   when the thyroid antibodies are negative  TG by [facility]   On aug 7 2019  is 4.4,  when TSH is suppressed to 0.028     ultrasound is negative  From sept 2019     TG by [facility] nov 2019-  0.1   TG  By [facility]  In feb 2020  -  0.3   JUNE 2020  :  [address] [facility] [facility] [address] DIAGNOSIS WITH PTC  ULTRASOUND FOR SURVEILLANCE   NEGATIVE FOR RECURRENCE      April 2021  :  F/u usg in oct 2021 for surveillance   Will plan for thyrogen stimulated TG marker check     Oct 2021 : patient is given [facility] June 2021  -  TG comp  ICMA   is 5.7,  TSH was 21  , Thyroid antibodies are negative   As the TG is positive, there is a possibility of microscopic left over thyroid   usg and thyroid scan are negative     2. HYPOTHYROIDISM :    TSH  IS SUPPRESSED AND  IS NEEDED FOR THYROID CANCER MANAGEMENT   Deleted notes   Jan 2020   : suppressed TSH  < 0.001 - asked patient to reduce weekly dose to 650 mcg a WEEK   ( 100 mcg daily, but on sundays 50 mcg )  But patient stayed on  100 mcg a day which is 700 mcg a WEEK ( that is 100 mcg a day )   March 2020  : TSH is 0.016   - STARTED   on 88 mcg a day of uNIthroid a day   ON MARCH 11 2020 and I stopped 100 mcg dose    June 2020  : AWAIT LABS - ON UNITHRODI [address] , FEELS A LOT BETTER   July 2020  - labs    :   TSH  -  0.096     Oct 2020  :  TSH - 0.11     ON UNITHROID  88 MCG A DAY , she will stay on the same dose   April 2021  :  TSH is 0.02, went lower - while on the same dose - FLUCTUANT .  Continuing on the same dose   Oct 2021 : TSH was 0.042  And will continue UNITHROID 88 MCG A DAY       4.  Pre- diabetes : A1c is  5.7 %    From April 2021    comPAred   To    5.9  %  To do TLC       5. SENILE osteoporosis  : I ordered DEXA       Reviewed results with patient and discussed the labs being ordered today/bnv  Patient voiced understanding of plan of care

## 2021-11-03 ENCOUNTER — OFFICE VISIT (OUTPATIENT)
Dept: ENT CLINIC | Age: 71
End: 2021-11-03
Payer: COMMERCIAL

## 2021-11-03 VITALS
RESPIRATION RATE: 16 BRPM | BODY MASS INDEX: 27.09 KG/M2 | HEIGHT: 60 IN | HEART RATE: 65 BPM | DIASTOLIC BLOOD PRESSURE: 76 MMHG | SYSTOLIC BLOOD PRESSURE: 120 MMHG | OXYGEN SATURATION: 98 % | WEIGHT: 138 LBS | TEMPERATURE: 97.6 F

## 2021-11-03 DIAGNOSIS — E89.0 POSTOPERATIVE HYPOTHYROIDISM: ICD-10-CM

## 2021-11-03 DIAGNOSIS — C73 PAPILLARY THYROID CARCINOMA (HCC): Primary | ICD-10-CM

## 2021-11-03 DIAGNOSIS — R49.0 DYSPHONIA: ICD-10-CM

## 2021-11-03 DIAGNOSIS — J38.01 VOCAL CORD PARALYSIS, UNILATERAL COMPLETE: ICD-10-CM

## 2021-11-03 PROCEDURE — 31575 DIAGNOSTIC LARYNGOSCOPY: CPT | Performed by: OTOLARYNGOLOGY

## 2021-11-03 PROCEDURE — 99214 OFFICE O/P EST MOD 30 MIN: CPT | Performed by: OTOLARYNGOLOGY

## 2021-11-03 NOTE — PROGRESS NOTES
Visit Vitals  /76 (BP 1 Location: Left upper arm, BP Patient Position: Sitting, BP Cuff Size: Adult)   Pulse 65   Temp 97.6 °F (36.4 °C) (Temporal)   Resp 16   Ht 5' (1.524 m)   Wt 138 lb (62.6 kg)   SpO2 98%   BMI 26.95 kg/m²     Chief Complaint   Patient presents with    Follow-up     F/U Thyroid Cancer-6 Month follow up   1. Have you been to the ER, urgent care clinic since your last visit? Hospitalized since your last visit? No    2. Have you seen or consulted any other health care providers outside of the [address] [place] [address] since your last visit? Include any pap smears or colon screening.  No

## 2021-11-03 NOTE — LETTER
11/3/2021    Patient: Bridget Valdovinos   YOB: 1950   Date of Visit: 11/3/2021     Marilynn Quinones MD  [address] [id number]  Via Fax: [phone]    Dear Marilynn Quinones MD,      Thank you for referring Ms. Audrey Grayson to [facility] EAR NOSE AND THROAT [address], THROAT AND ALLERGY CARE for evaluation. My notes for this consultation are attached. If you have questions, please do not hesitate to call me. I look forward to following your patient along with you.       Sincerely,    Nisha Julien MD

## 2021-11-03 NOTE — PROGRESS NOTES
Subjective:    Audrey Grayson   73 y.o.   1950     Followup Visit    Location -thyroid    Quality -papillary thyroid cancer stage IV    Severity -moderate to severe    Duration -approximately 2 years    Timing -chronic    Context -following up today, patient is nearly 2 years out from total thyroidectomy and neck dissection for stage IV papillary thyroid cancer of the left thyroid with central neck and left lateral neck lymph nodes and invasion of the recurrent laryngeal nerve, simultaneous left vocal cord injection. She has been doing overall well has been following up with endocrinology last seen in October. At that time things were relatively stable. She has no new complaints today other than past couple of months some intermittent left ear symptoms no pain occasional feeling of something moving in the ear and also radiating to the temple. She does work out quite a bit has been swimming a lot, does complain of the left neck being a little stiff, no change in hearing    Modifying Features -none    Associated symptoms/signs -as above, does have a left-sided tooth that ultimately needs to be pulled    11/3/2021 -6-month follow-up thyroid cancer -patient overall has been doing well. She had thyroid ultrasound last month and whole body iodine scan in June. Maintains on Unithroid. Voice overall is stable but she does admit to having some fluctuations at times and occasionally some vocal fatigue. Review of Systems  Review of Systems   Constitutional: Negative for chills and fever. HENT: Negative for ear pain, hearing loss, nosebleeds and tinnitus. Eyes: Negative for blurred vision and double vision. Respiratory: Negative for cough, sputum production and shortness of breath. Cardiovascular: Negative for chest pain and palpitations. Gastrointestinal: Negative for heartburn, nausea and vomiting. Musculoskeletal: Positive for myalgias. Negative for joint pain and neck pain. Skin: Negative. Neurological: Negative for dizziness, speech change, weakness and headaches. Endo/Heme/Allergies: Negative for environmental allergies. Does not bruise/bleed easily. Psychiatric/Behavioral: Negative for memory loss. The patient does not have insomnia. Past Medical History:   Diagnosis Date    Arthritis      Prior to Admission medications    Medication Sig Start Date End Date Taking? Authorizing Provider   Unithroid 88 mcg tablet BMN  TAKE 1 TABLET BY MOUTH DAILY BEFORE BREAKFAST 10/26/21  Yes Mango Young MD   aspirin 81 mg chewable tablet Take 81 mg by mouth as needed. Yes Provider, Historical   acetaminophen (TYLENOL) 325 mg tablet Take  by mouth every four (4) hours as needed for Pain. Yes Provider, Historical   atorvastatin (LIPITOR) 20 mg tablet Take  by mouth daily. Yes Provider, Historical   calcium-cholecalciferol, d3, (CALCIUM 600 + D) 600-125 mg-unit tab Take  by mouth. Yes Provider, Historical   Cetirizine (ZYRTEC) 10 mg cap Take 1 Tab by mouth daily. Patient not taking: Reported on 10/26/2021    Provider, Historical            Objective:     Visit Vitals  /76 (BP 1 Location: Left upper arm, BP Patient Position: Sitting, BP Cuff Size: Adult)   Pulse 65   Temp 97.6 °F (36.4 °C) (Temporal)   Resp 16   Ht 5' (1.524 m)   Wt 138 lb (62.6 kg)   SpO2 98%   BMI 26.95 kg/m²        Physical Exam  Vitals reviewed. Constitutional:       General: She is awake. She is not in acute distress. Appearance: Normal appearance. She is well-groomed and normal weight. HENT:      Head: Normocephalic and atraumatic. Jaw: There is normal jaw occlusion. No trismus, tenderness or malocclusion. Salivary Glands: Right salivary gland is not diffusely enlarged or tender. Left salivary gland is not diffusely enlarged or tender.       Right Ear: Hearing, tympanic membrane, ear canal and external ear normal.      Left Ear: Hearing, tympanic membrane, ear canal and external ear normal. Ears:      Moeller exam findings: does not lateralize. Right Rinne: AC > BC. Left Rinne: AC > BC. Nose: No nasal deformity, septal deviation or mucosal edema. Right Nostril: No epistaxis. Left Nostril: No epistaxis. Right Turbinates: Not enlarged, swollen or pale. Left Turbinates: Not enlarged, swollen or pale. Right Sinus: No maxillary sinus tenderness or frontal sinus tenderness. Left Sinus: No maxillary sinus tenderness or frontal sinus tenderness. Mouth/Throat:      Lips: Pink. No lesions. Mouth: Mucous membranes are moist. No oral lesions. Dentition: No dental caries. Tongue: No lesions. Palate: No mass and lesions. Pharynx: Oropharynx is clear. Uvula midline. No oropharyngeal exudate or posterior oropharyngeal erythema. Tonsils: No tonsillar exudate. 0 on the right. 0 on the left. Eyes:      General: Vision grossly intact. Extraocular Movements: Extraocular movements intact. Right eye: No nystagmus. Left eye: No nystagmus. Conjunctiva/sclera: Conjunctivae normal.      Pupils: Pupils are equal, round, and reactive to light. Neck:      Thyroid: No thyroid mass, thyromegaly or thyroid tenderness. Trachea: Trachea normal. No tracheal tenderness or tracheal deviation. Comments: Left neck well-healed incision  No mass or adenopathy felt, no mass in the thyroid bed  Voice is overall stable, some mild to moderate pitch changes  Cardiovascular:      Rate and Rhythm: Normal rate and regular rhythm. Pulmonary:      Effort: Pulmonary effort is normal. No respiratory distress. Breath sounds: No stridor. Musculoskeletal:         General: No swelling or tenderness. Normal range of motion. Cervical back: No edema or erythema. No muscular tenderness. Lymphadenopathy:      Cervical: No cervical adenopathy. Skin:     General: Skin is warm and dry. Findings: No lesion or rash.    Neurological: General: No focal deficit present. Mental Status: She is alert and oriented to person, place, and time. Mental status is at baseline. Cranial Nerves: Cranial nerves are intact. Coordination: Romberg sign negative. Gait: Gait is intact. Psychiatric:         Mood and Affect: Mood normal.         Behavior: Behavior normal. Behavior is cooperative. Procedure Note - Fiberoptic Laryngoscopy    Verbal consent is obtained. The nares are sprayed with topical lidocaine/oxymetazoline solution. After several minutes the fiberoptic scope is advanced into one or both nasal passages. Findings are as summarized. Nose - normal turbinates, septum  Nasopharynx - normal eustachian tubes, no mucosal lesions  Oropharynx - normal tonsils, tongue base and posterior wall  Hypopharynx - normal pyriform sinus and post-cricoid region  Larynx - normal epiglottis, arytenoids, right false cord hyperfunction, left cord paralysis   Subglottis - visualized upper trachea is normal      Assessment/Plan:     Encounter Diagnoses   Name Primary?  Papillary thyroid carcinoma (HCC) Yes    Postoperative hypothyroidism     Vocal cord paralysis, unilateral complete     Dysphonia      Papillary thyroid carcinoma  -approximately 2.5 years out from total thyroidectomy and neck dissection. Clinically CLYDE today. Had negative thyroid ultrasound last month and negative whole-body iodine scan in June 2021. Stimulated thyroglobulin was elevated at 5.6. She continues follow-up with Dr. Conrad Sosa with me in 6 months for this. Vocal paralysis -her dysphonia is gradually becoming more symptomatic. She remains with a level cord paralysis. We discussed the option to redo the vocal cord injection. We decided to monitor for 6 more months and reassess at next visit. Orders Placed This Encounter    LARYNGOSCOPY,FLEX FIBER,DIAGNOSTIC     Follow-up and Dispositions    · Return in about 6 months (around 5/3/2022). Musa Machado MD, [address] [facility]  [address] [place] [address] [address]  [facility] [phone]

## 2021-11-22 ENCOUNTER — HOSPITAL ENCOUNTER (OUTPATIENT)
Dept: MAMMOGRAPHY | Age: 71
Discharge: HOME | End: 2021-11-22
Attending: INTERNAL MEDICINE
Payer: COMMERCIAL

## 2021-11-22 DIAGNOSIS — M81.0 SENILE OSTEOPOROSIS: ICD-10-CM

## 2021-11-22 PROCEDURE — 77080 DXA BONE DENSITY AXIAL: CPT

## 2022-03-18 PROBLEM — R49.0 DYSPHONIA: Status: ACTIVE | Noted: 2021-11-03

## 2022-03-18 PROBLEM — C73 PAPILLARY THYROID CARCINOMA (HCC): Status: ACTIVE | Noted: 2021-04-06

## 2022-03-19 PROBLEM — E89.0 POSTOPERATIVE HYPOTHYROIDISM: Status: ACTIVE | Noted: 2021-04-06

## 2022-03-19 PROBLEM — J38.01 VOCAL CORD PARALYSIS, UNILATERAL COMPLETE: Status: ACTIVE | Noted: 2021-04-06

## 2022-04-24 LAB
T4 FREE SERPL-MCNC: 1.69 NG/DL (ref 0.82–1.77)
THYROGLOB AB SERPL-ACNC: <1 IU/ML
THYROGLOB SERPL-MCNC: 0.4 NG/ML
THYROGLOB SERPL-MCNC: NORMAL NG/ML
TSH SERPL DL<=0.005 MIU/L-ACNC: 0.12 UIU/ML (ref 0.45–4.5)

## 2022-04-26 ENCOUNTER — OFFICE VISIT (OUTPATIENT)
Dept: ENDOCRINOLOGY | Age: 72
End: 2022-04-26
Payer: COMMERCIAL

## 2022-04-26 VITALS
WEIGHT: 143.6 LBS | HEART RATE: 74 BPM | HEIGHT: 60 IN | TEMPERATURE: 97.7 F | SYSTOLIC BLOOD PRESSURE: 105 MMHG | OXYGEN SATURATION: 96 % | RESPIRATION RATE: 20 BRPM | BODY MASS INDEX: 28.19 KG/M2 | DIASTOLIC BLOOD PRESSURE: 68 MMHG

## 2022-04-26 DIAGNOSIS — C73 THYROID CANCER (HCC): ICD-10-CM

## 2022-04-26 DIAGNOSIS — E89.0 POSTOPERATIVE HYPOTHYROIDISM: Primary | ICD-10-CM

## 2022-04-26 PROCEDURE — 99214 OFFICE O/P EST MOD 30 MIN: CPT | Performed by: INTERNAL MEDICINE

## 2022-04-26 RX ORDER — LEVOTHYROXINE SODIUM 88 UG/1
TABLET ORAL
Qty: 90 TABLET | Refills: 3 | Status: SHIPPED | OUTPATIENT
Start: 2022-04-26

## 2022-04-26 NOTE — PROGRESS NOTES
HISTORY OF PRESENT ILLNESS  Audrey Mayfield is a 73 y.o. female. HPI  Patient is here for follow up  for management of Papillary  Thyroid cancer   And hypothyroidism    From  October 2021     Gained 5 lbs   C/o burning of feet again   Moises Alexander on and off       October 2021     Gained 2 lbs  She is compliant with unithroid         Old history :   Patient in general good state of health has Noticed hoarse voice 4 weeks ago   Got treated with antibiotics for a week, but hoarseness persisted   She saw Dr. Darci Gage and he ordered Ct/ usg neck   He found left vocal cord palsy on laryngoscopy   He ordered for FNA of thyroid mass and left supraclavicular LN  Which revealed PTC  Ct scan showed locally invasive metastatic tissues involvement   She underwent near total thyroidectomy with radical dissection of ipsilateral LN and central dissection of LN surgery  On May 8 2019 at [facility]        Review of Systems   Constitutional: Negative. Hoarse voice on and off   Psychiatric/Behavioral: Negative. Physical Exam   Constitutional: She is oriented to person, place, and time. She appears well-developed and well-nourished. Neck: Normal range of motion. Neck supple. (healed surgical scar for thyroidectomy ) present. Psychiatric: She has a normal mood and affect. Lab Results   Component Value Date/Time    TSH 0.122 (L) 04/20/2022 12:00 AM    Triiodothyronine (T3), free 6.1 (H) 05/21/2019 10:15 AM    T4, Free 1.69 04/20/2022 12:00 AM        ASSESSMENT and PLAN    1. 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- it is therefore  PT4a, p N1b ;  It is STAGE IV   VLADISLAV  RISK STRATIFICATION : INTERMEDIATE for recurrence     S/p   FRIAS ABLATION   June 13 2019 , WBS - localized in neck, no other spread   TG by [facility]  In June 2019  Was 8.9     And  TSH was 27   when the thyroid antibodies are negative  TG by [facility]   On aug 7 2019  is 4.4,  when TSH is suppressed to 0.028     ultrasound is negative  From sept 2019     TG by [facility] nov 2019-  0.1   TG  By [facility]  In feb 2020  -  0.3   JUNE 2020  :  [address] DIAGNOSIS WITH PTC  ULTRASOUND FOR SURVEILLANCE   NEGATIVE FOR RECURRENCE    Oct 2021 : patient is given [facility] June 2021  -  TG comp  ICMA   is 5.7,  TSH was 21  , Thyroid antibodies are negative   As the TG is positive, there is a possibility of microscopic left over thyroid   usg and thyroid scan are negative      April 2022  - [facility] - TG is 0.4 ( suppressed )  Explained to patient the details of labs -  Ordering usg for surveillance in oct 2022       2. HYPOTHYROIDISM :  TSH  To be maintained between 0.1 to 0.5    April 2022  :  TSH is 0.122  - stay on unithroid  88 mcg a day     3. BMD  -   Date : nov 2021   Bone DEXA  ap spine T-score -0.5; left femoral Neck T- score  0.8, right femoral neck T-score -0.8  Excellent bone quality to stay on calcium and vit D     4.  Hoarse voice - she f/u with Dr. Izzy Weinberg       Reviewed results with patient and discussed the labs being ordered today/bnv  Patient voiced understanding of plan of care

## 2022-04-26 NOTE — PATIENT INSTRUCTIONS
SPECIFIC INSTRUCTIONS BELOW       Take calcium 500 mg one pill twice   A   Day   Vit D  1000 units once a day       Adelaida walter or [facility] - b12  ( b complex  )    ---------------------------------------------------------------------------------------------------------------      unithroid  88  mcg  A day,   BRAND  Every day ,    on empty stomach with water only, no other meds or food or drinks   For next half hour     Take any kind of vitamins, calcium, iron   Pills  4 hours later      -------------PAY ATTENTION TO THESE GENERAL INSTRUCTIONS -----------------      - The medications prescribed at this visit will not be available at pharmacy until 6 pm       - YOUR MED LIST IS NOT UP TO DATE AS SOME CHANGES ARE BEING MADE AFTER THE VISIT - [address] [place] [address]     -ANY tests other than blood work, which you opt to do  outside the  [facility] imaging facilities, you are responsible for prior authorizations if  required    - [address] UP TO DATE ON YOUR AVS- PLEASE IGNORE     Results     *Normal results will not be notified by a phone call starting January 1 2021   *If you have an upcoming visit, the results will be discussed at the visit   *Please sign up for MY CHART if you want access to your lab and test results  *Abnormal results which require immediate attention will be notified by phone call   *Abnormal results which do not require immediate assistance will be notified in 1-2 weeks       Refills    -    have your pharmacy send us a refill request . Refills are done max for one year and a visit is a must before refills are extended    Follow up appointments -  highly encourage you to make it when you are checking out. We can accommodate you into the schedule based on your clinical situation, but not for extending refills beyond a year. Labs are important to give refills and is important to get labs before the visit     Phone calls  -  Allow  24 hrs.  for non-urgent calls to be returned  Prior authorization - It may take 2-4 weeks to process  Forms  -  FMLA, DMV etc., will take up to 2 weeks to process  Cancellations - please notify the office 2 days in advance   Samples  - will only be dispensed at visits       If not showing for the appointments and cancelling appointments within 24 hours are kept track of and three  of such situations in  two consecutive years will likely be considered for termination from the practice    -------------------------------------------------------------------------------------------------------------------

## 2022-05-11 ENCOUNTER — OFFICE VISIT (OUTPATIENT)
Dept: ENT CLINIC | Age: 72
End: 2022-05-11
Payer: COMMERCIAL

## 2022-05-11 VITALS
BODY MASS INDEX: 28.07 KG/M2 | HEART RATE: 73 BPM | OXYGEN SATURATION: 99 % | SYSTOLIC BLOOD PRESSURE: 118 MMHG | WEIGHT: 143 LBS | DIASTOLIC BLOOD PRESSURE: 74 MMHG | RESPIRATION RATE: 18 BRPM | HEIGHT: 60 IN

## 2022-05-11 DIAGNOSIS — E89.0 POSTOPERATIVE HYPOTHYROIDISM: ICD-10-CM

## 2022-05-11 DIAGNOSIS — J38.01 VOCAL CORD PARALYSIS, UNILATERAL COMPLETE: ICD-10-CM

## 2022-05-11 DIAGNOSIS — C73 PAPILLARY THYROID CARCINOMA (HCC): Primary | ICD-10-CM

## 2022-05-11 DIAGNOSIS — R49.0 DYSPHONIA: ICD-10-CM

## 2022-05-11 PROCEDURE — 3017F COLORECTAL CA SCREEN DOC REV: CPT | Performed by: OTOLARYNGOLOGY

## 2022-05-11 PROCEDURE — 1090F PRES/ABSN URINE INCON ASSESS: CPT | Performed by: OTOLARYNGOLOGY

## 2022-05-11 PROCEDURE — G8427 DOCREV CUR MEDS BY ELIG CLIN: HCPCS | Performed by: OTOLARYNGOLOGY

## 2022-05-11 PROCEDURE — 1101F PT FALLS ASSESS-DOCD LE1/YR: CPT | Performed by: OTOLARYNGOLOGY

## 2022-05-11 PROCEDURE — 99214 OFFICE O/P EST MOD 30 MIN: CPT | Performed by: OTOLARYNGOLOGY

## 2022-05-11 PROCEDURE — G8399 PT W/DXA RESULTS DOCUMENT: HCPCS | Performed by: OTOLARYNGOLOGY

## 2022-05-11 PROCEDURE — G8419 CALC BMI OUT NRM PARAM NOF/U: HCPCS | Performed by: OTOLARYNGOLOGY

## 2022-05-11 PROCEDURE — G8536 NO DOC ELDER MAL SCRN: HCPCS | Performed by: OTOLARYNGOLOGY

## 2022-05-11 PROCEDURE — G8510 SCR DEP NEG, NO PLAN REQD: HCPCS | Performed by: OTOLARYNGOLOGY

## 2022-05-11 NOTE — LETTER
5/11/2022    Patient: Mery Dominguez   YOB: 1950   Date of Visit: 5/11/2022     Garrett Hoff MD  [address] [address]  Via Fax: [phone]    Dear Garrett Hoff MD,      Thank you for referring Ms. Audrey Grayson to [facility] [address], [facility] for evaluation. My notes for this consultation are attached. If you have questions, please do not hesitate to call me. I look forward to following your patient along with you.       Sincerely,    Tiki Brandt MD

## 2022-05-11 NOTE — PROGRESS NOTES
Subjective:    Audrey Grayson   73 y.o.   1950     Followup Visit    Location -thyroid    Quality -papillary thyroid cancer stage IV    Severity -moderate to severe    Duration -approximately 2 years    Timing -chronic    Context -following up today, patient is nearly 2 years out from total thyroidectomy and neck dissection for stage IV papillary thyroid cancer of the left thyroid with central neck and left lateral neck lymph nodes and invasion of the recurrent laryngeal nerve, simultaneous left vocal cord injection. She has been doing overall well has been following up with endocrinology last seen in October. At that time things were relatively stable. She has no new complaints today other than past couple of months some intermittent left ear symptoms no pain occasional feeling of something moving in the ear and also radiating to the temple. She does work out quite a bit has been swimming a lot, does complain of the left neck being a little stiff, no change in hearing    Modifying Features -none    Associated symptoms/signs -as above, does have a left-sided tooth that ultimately needs to be pulled    11/3/2021 -6-month follow-up thyroid cancer -patient overall has been doing well. She had thyroid ultrasound last month and whole body iodine scan in June. Maintains on Unithroid. Voice overall is stable but she does admit to having some fluctuations at times and occasionally some vocal fatigue. 5/11/22 -6-month follow-up, stage IV papillary thyroid carcinoma. Patient states overall she has been doing well. She continues her follow-up with endocrinology was last seen there just last month. Physically she has been doing well. Voice remains the same with occasional fluctuations and some vocal fatigue after longer conversations. No prolonged episodes of losing her voice. Swallowing well. Review of Systems  Review of Systems   Constitutional: Negative for chills and fever.    HENT: Negative for ear pain, hearing loss, nosebleeds and tinnitus. Eyes: Negative for blurred vision and double vision. Respiratory: Negative for cough, sputum production and shortness of breath. Cardiovascular: Negative for chest pain and palpitations. Gastrointestinal: Negative for heartburn, nausea and vomiting. Musculoskeletal: Positive for myalgias. Negative for joint pain and neck pain. Skin: Negative. Neurological: Negative for dizziness, speech change, weakness and headaches. Endo/Heme/Allergies: Negative for environmental allergies. Does not bruise/bleed easily. Psychiatric/Behavioral: Negative for memory loss. The patient does not have insomnia. Past Medical History:   Diagnosis Date    Arthritis      Prior to Admission medications    Medication Sig Start Date End Date Taking? Authorizing Provider   Unithroid 88 mcg tablet BMN  TAKE 1 TABLET BY MOUTH DAILY BEFORE BREAKFAST 4/26/22   Truong Fernandez MD   aspirin 81 mg chewable tablet Take 81 mg by mouth as needed. Provider, Historical   acetaminophen (TYLENOL) 325 mg tablet Take  by mouth every four (4) hours as needed for Pain. Patient not taking: Reported on 4/26/2022    Provider, Historical   Cetirizine (ZYRTEC) 10 mg cap Take 1 Tab by mouth daily. Patient not taking: Reported on 10/26/2021    Provider, Historical   atorvastatin (LIPITOR) 20 mg tablet Take  by mouth daily. Provider, Historical   calcium-cholecalciferol, d3, (CALCIUM 600 + D) 600-125 mg-unit tab Take  by mouth. Provider, Historical            Objective:     Visit Vitals  /74 (BP 1 Location: Left upper arm, BP Patient Position: Sitting, BP Cuff Size: Adult)   Pulse 73   Resp 18   Ht 5' (1.524 m)   Wt 143 lb (64.9 kg)   SpO2 99%   BMI 27.93 kg/m²        Physical Exam  Vitals reviewed. Constitutional:       General: She is awake. She is not in acute distress. Appearance: Normal appearance. She is well-groomed and normal weight.    HENT:      Head: Normocephalic and atraumatic. Jaw: There is normal jaw occlusion. No trismus, tenderness or malocclusion. Salivary Glands: Right salivary gland is not diffusely enlarged or tender. Left salivary gland is not diffusely enlarged or tender. Right Ear: Hearing, tympanic membrane, ear canal and external ear normal.      Left Ear: Hearing, tympanic membrane, ear canal and external ear normal.      Ears:      Moeller exam findings: does not lateralize. Right Rinne: AC > BC. Left Rinne: AC > BC. Nose: No nasal deformity, septal deviation or mucosal edema. Right Nostril: No epistaxis. Left Nostril: No epistaxis. Right Turbinates: Not enlarged, swollen or pale. Left Turbinates: Not enlarged, swollen or pale. Right Sinus: No maxillary sinus tenderness or frontal sinus tenderness. Left Sinus: No maxillary sinus tenderness or frontal sinus tenderness. Mouth/Throat:      Lips: Pink. No lesions. Mouth: Mucous membranes are moist. No oral lesions. Dentition: No dental caries. Tongue: No lesions. Palate: No mass and lesions. Pharynx: Oropharynx is clear. Uvula midline. No oropharyngeal exudate or posterior oropharyngeal erythema. Tonsils: No tonsillar exudate. 0 on the right. 0 on the left. Eyes:      General: Vision grossly intact. Extraocular Movements: Extraocular movements intact. Right eye: No nystagmus. Left eye: No nystagmus. Conjunctiva/sclera: Conjunctivae normal.      Pupils: Pupils are equal, round, and reactive to light. Neck:      Thyroid: No thyroid mass, thyromegaly or thyroid tenderness. Trachea: Trachea normal. No tracheal tenderness or tracheal deviation. Comments: Left neck well-healed incision  No mass or adenopathy felt, no mass in the thyroid bed  Voice is overall stable, some mild to moderate pitch changes  Cardiovascular:      Rate and Rhythm: Normal rate and regular rhythm.    Pulmonary: Effort: Pulmonary effort is normal. No respiratory distress. Breath sounds: No stridor. Musculoskeletal:         General: No swelling or tenderness. Normal range of motion. Cervical back: No edema or erythema. No muscular tenderness. Lymphadenopathy:      Cervical: No cervical adenopathy. Skin:     General: Skin is warm and dry. Findings: No lesion or rash. Neurological:      General: No focal deficit present. Mental Status: She is alert and oriented to person, place, and time. Mental status is at baseline. Cranial Nerves: Cranial nerves are intact. Coordination: Romberg sign negative. Gait: Gait is intact. Psychiatric:         Mood and Affect: Mood normal.         Behavior: Behavior normal. Behavior is cooperative. Assessment/Plan:     Encounter Diagnoses   Name Primary?  Papillary thyroid carcinoma (HCC) Yes    Postoperative hypothyroidism     Vocal cord paralysis, unilateral complete     Dysphonia      Papillary thyroid carcinoma  -now 3 years out from total thyroidectomy and neck dissection. Clinically CLYDE today. Had negative thyroid ultrasound and negative whole-body iodine scan in June 2021. Most recent thyroglobulin remains stable at 0.4. She continues follow-up with Dr. Fani Mckeon with me in 6 months for this. Vocal paralysis -her dysphonia remained stable. She remains with a level cord paralysis. Voice is not breathy nor weak. At this point I do not think she would benefit from further vocal cord injection. We can always revisit this option in the future if vocal quality changes significantly. No orders of the defined types were placed in this encounter. Follow-up and Dispositions    · Return in about 6 months (around 11/11/2022). Musa Grant MD, [address] [facility]  [address] [place] [address] [address]  [facility] [phone]. [phone]

## 2022-10-24 LAB
T4 FREE SERPL-MCNC: 1.74 NG/DL (ref 0.82–1.77)
THYROGLOB AB SERPL-ACNC: <1 IU/ML
THYROGLOB SERPL-MCNC: 0.4 NG/ML
THYROGLOB SERPL-MCNC: NORMAL NG/ML
TSH SERPL DL<=0.005 MIU/L-ACNC: 0.11 UIU/ML (ref 0.45–4.5)

## 2022-10-26 ENCOUNTER — HOSPITAL ENCOUNTER (OUTPATIENT)
Dept: ULTRASOUND IMAGING | Age: 72
Discharge: HOME | End: 2022-10-26
Attending: INTERNAL MEDICINE
Payer: COMMERCIAL

## 2022-10-26 DIAGNOSIS — C73 THYROID CANCER (HCC): ICD-10-CM

## 2022-10-26 PROCEDURE — 76536 US EXAM OF HEAD AND NECK: CPT

## 2022-11-04 ENCOUNTER — OFFICE VISIT (OUTPATIENT)
Dept: ENDOCRINOLOGY | Age: 72
End: 2022-11-04
Payer: COMMERCIAL

## 2022-11-04 VITALS
DIASTOLIC BLOOD PRESSURE: 67 MMHG | WEIGHT: 144 LBS | TEMPERATURE: 98 F | HEIGHT: 60 IN | HEART RATE: 77 BPM | RESPIRATION RATE: 22 BRPM | BODY MASS INDEX: 28.27 KG/M2 | OXYGEN SATURATION: 98 % | SYSTOLIC BLOOD PRESSURE: 116 MMHG

## 2022-11-04 DIAGNOSIS — M81.0 SENILE OSTEOPOROSIS: ICD-10-CM

## 2022-11-04 DIAGNOSIS — R73.03 PRE-DIABETES: ICD-10-CM

## 2022-11-04 DIAGNOSIS — E89.0 POSTOPERATIVE HYPOTHYROIDISM: Primary | ICD-10-CM

## 2022-11-04 DIAGNOSIS — C73 THYROID CANCER (HCC): ICD-10-CM

## 2022-11-04 PROCEDURE — 99214 OFFICE O/P EST MOD 30 MIN: CPT | Performed by: INTERNAL MEDICINE

## 2022-11-04 PROCEDURE — 1123F ACP DISCUSS/DSCN MKR DOCD: CPT | Performed by: INTERNAL MEDICINE

## 2022-11-04 RX ORDER — MULTIVIT WITH MINERALS/HERBS
1 TABLET ORAL AS NEEDED
COMMUNITY

## 2022-11-04 NOTE — PROGRESS NOTES
Joseph Brown is a 81 y.o. female here for   Chief Complaint   Patient presents with    Thyroid Problem       1. Have you been to the ER, urgent care clinic since your last visit? Hospitalized since your last visit? -no    2. Have you seen or consulted any other health care providers outside of the [address] [place] [address] since your last visit? Include any pap smears or colon screening. -PCP

## 2022-11-04 NOTE — PROGRESS NOTES
HISTORY OF PRESENT ILLNESS  Audrey Chiu is a 81 y.o. female. HPI  Patient is here for follow up  for management of Papillary  Thyroid cancer   And hypothyroidism    From  April 2022     Gained 1 lb   She had good time in the interim , visited [place]  She has a nasal twang ( ran in cold vance) - bubble run she says       April 2022     Gained 5 lbs   C/o burning of feet again   [employer] voice on and off       Old history :   Patient in general good state of health has Noticed hoarse voice 4 weeks ago   Got treated with antibiotics for a week, but hoarseness persisted   She saw Dr. Yuliya Velazquez and he ordered Ct/ usg neck   He found left vocal cord palsy on laryngoscopy   He ordered for FNA of thyroid mass and left supraclavicular LN  Which revealed PTC  Ct scan showed locally invasive metastatic tissues involvement   She underwent near total thyroidectomy with radical dissection of ipsilateral LN and central dissection of LN surgery  On May 8 2019 at [address] [place] [address]        Review of Systems   Constitutional: Negative. Hoarse voice on and off   Psychiatric/Behavioral: Negative. Physical Exam   Constitutional: She is oriented to person, place, and time. She appears well-developed and well-nourished. Neck: Normal range of motion. Neck supple. (healed surgical scar for thyroidectomy ) present. Psychiatric: She has a normal mood and affect. Lab Results   Component Value Date/Time    TSH 0.113 (L) 10/19/2022 12:00 AM    Triiodothyronine (T3), free 6.1 (H) 05/21/2019 10:15 AM    T4, Free 1.74 10/19/2022 12:00 AM        ASSESSMENT and PLAN    1. THYROID CANCER  :  PTC METASTATIC, LOCALLY INVASIVE   S/P NEAR TOTAL THYROIDECTOMY  WITH LEFT SIDED RADICAL NECK DISSECTION   surg path : May 8 2019  - Left lobe PTC 4 cm ; Central neck 3/6 LN metastatic ; Left lateral neck  4/9 LN metastatic   Right lobe  1 mm focus ;  Largest metastatic LN is 2.8 cm  AJCC  8th  pT2, pN1b   surg path   Pt 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 recurrent laryngeal nerve involved- it is therefore  PT4a, p N1b ; It is STAGE IV   VLADISLAV  RISK STRATIFICATION : INTERMEDIATE for recurrence   S/p   FRIAS ABLATION   June 13 2019 , WBS - localized in neck, no other spread   TG by [facility]  In June 2019  Was 8.9     And  TSH was 27   when the thyroid antibodies are negative  TG by [facility]   On aug 7 2019  is 4.4,  when TSH is suppressed to 0.028     ultrasound is negative  From sept 2019   ; TG by [facility] nov 2019-  0.1   TG  By [facility]  In feb 2020  -  0.3   JUNE 2020  :  [address] [facility] DIAGNOSIS WITH PTC  ULTRASOUND FOR SURVEILLANCE   NEGATIVE FOR RECURRENCE    Oct 2021 : patient is given [facility] June 2021  -  TG comp  ICMA   is 5.7,  TSH was 21  , Thyroid antibodies are negative   As the TG is positive, there is a possibility of microscopic left over thyroid   usg and thyroid scan are negative      April 2022  - ICMA - TG is 0.4 ( suppressed )  Explained to patient the details of labs -  Ordering usg for surveillance in oct 2022   November 2022  -   3.5 years into diagnosis   :   TG  by [facility]   - 0.4  stable   usg is negative for  recurrence       2. HYPOTHYROIDISM :  TSH  To be maintained between 0.1 to 0.5    April 2022  :  TSH is 0.122  - stay on unithroid  88 mcg a day   October 2022  : TSH is 0.113  - stable - stay on UNITHROID 88 mcg  a day     3.   BMD  -   Date : nov 2021   Bone DEXA  ap spine T-score -0.5; left femoral Neck T- score  0.8, right femoral neck T-score -0.8  Excellent bone quality to stay on calcium and vit D         Reviewed results with patient and discussed the labs being ordered today/bnv  Patient voiced understanding of plan of care

## 2022-11-04 NOTE — LETTER
11/4/2022    Patient: Ifrah Hurst   YOB: 1950   Date of Visit: 11/4/2022     Rocio Carney MD  [facility] [address] [address]  Via Fax: [phone]    Dear Rocio Carney MD,      Thank you for referring Ms. Audrey Grayson to [facility] for evaluation. My notes for this consultation are attached. If you have questions, please do not hesitate to call me. I look forward to following your patient along with you.       Sincerely,    Sylvain Scuhler MD

## 2022-11-04 NOTE — PATIENT INSTRUCTIONS
SPECIFIC INSTRUCTIONS BELOW       Darrell walter or [facility] - b12  ( b complex  )    ---------------------------------------------------------------------------------------------------------------      unithroid  88  mcg  A day,   BRAND  Every day ,    on empty stomach with water only, no other meds or food or drinks   For next half hour     Take any kind of vitamins, calcium, iron   Pills  4 hours later        -------------PAY ATTENTION TO THESE GENERAL INSTRUCTIONS -----------------      - The medications prescribed at this visit will not be available at pharmacy until 6 pm       - YOUR MED LIST IS NOT UP TO DATE AS SOME CHANGES ARE BEING MADE AFTER THE VISIT - FOLLOW SPECIFIC INSTRUCTIONS  ABOVE     -ANY tests other than blood work, which you opt to do  outside the  [facility] imaging facilities, you are responsible for prior authorizations if  required    - [address] UP TO DATE ON YOUR AVS- PLEASE IGNORE     Results     *Normal results will not be notified by a phone call starting January 1 2021   *If you have an upcoming visit, the results will be discussed at the visit   *Please sign up for MY CHART if you want access to your lab and test results  *Abnormal results which require immediate attention will be notified by phone call   *Abnormal results which do not require immediate assistance will be notified in 1-2 weeks       Refills    -    have your pharmacy send us a refill request . Refills are done max for one year and a visit is a must before refills are extended    Follow up appointments -  highly encourage you to make it when you are checking out. We can accommodate you into the schedule based on your clinical situation, but not for extending refills beyond a year. Labs are important to give refills and is important to get labs before the visit     Phone calls  -  Allow  24 hrs.  for non-urgent calls to be returned  Prior authorization - It may take 2-4 weeks to process  Forms -  FMLA, DMV etc., will take up to 2 weeks to process  Cancellations - please notify the office 2 days in advance   Samples  - will only be dispensed at visits       If not showing for the appointments and cancelling appointments within 24 hours are kept track of and three  of such situations in  two consecutive years will likely be considered for termination from the practice    -------------------------------------------------------------------------------------------------------------------

## 2022-11-09 ENCOUNTER — OFFICE VISIT (OUTPATIENT)
Dept: ENT CLINIC | Age: 72
End: 2022-11-09
Payer: COMMERCIAL

## 2022-11-09 VITALS
HEART RATE: 82 BPM | OXYGEN SATURATION: 97 % | BODY MASS INDEX: 28.32 KG/M2 | SYSTOLIC BLOOD PRESSURE: 110 MMHG | DIASTOLIC BLOOD PRESSURE: 64 MMHG | WEIGHT: 145 LBS

## 2022-11-09 DIAGNOSIS — C73 PAPILLARY THYROID CARCINOMA (HCC): Primary | ICD-10-CM

## 2022-11-09 DIAGNOSIS — E89.0 POSTOPERATIVE HYPOTHYROIDISM: ICD-10-CM

## 2022-11-09 DIAGNOSIS — J38.01 VOCAL CORD PARALYSIS, UNILATERAL COMPLETE: ICD-10-CM

## 2022-11-09 PROCEDURE — 99213 OFFICE O/P EST LOW 20 MIN: CPT | Performed by: OTOLARYNGOLOGY

## 2022-11-09 PROCEDURE — 1123F ACP DISCUSS/DSCN MKR DOCD: CPT | Performed by: OTOLARYNGOLOGY

## 2022-11-09 NOTE — PROGRESS NOTES
Subjective:    Audrey Grayson   81 y.o.   1950     Followup Visit    Location -thyroid    Quality -papillary thyroid cancer stage IV    Severity -moderate to severe    Duration -approximately 2 years    Timing -chronic    Context -following up today, patient is nearly 2 years out from total thyroidectomy and neck dissection for stage IV papillary thyroid cancer of the left thyroid with central neck and left lateral neck lymph nodes and invasion of the recurrent laryngeal nerve, simultaneous left vocal cord injection. She has been doing overall well has been following up with endocrinology last seen in October. At that time things were relatively stable. She has no new complaints today other than past couple of months some intermittent left ear symptoms no pain occasional feeling of something moving in the ear and also radiating to the temple. She does work out quite a bit has been swimming a lot, does complain of the left neck being a little stiff, no change in hearing    Modifying Features -none    Associated symptoms/signs -as above, does have a left-sided tooth that ultimately needs to be pulled    11/3/2021 -6-month follow-up thyroid cancer -patient overall has been doing well. She had thyroid ultrasound last month and whole body iodine scan in June. Maintains on Unithroid. Voice overall is stable but she does admit to having some fluctuations at times and occasionally some vocal fatigue. 5/11/22 -6-month follow-up, stage IV papillary thyroid carcinoma. Patient states overall she has been doing well. She continues her follow-up with endocrinology was last seen there just last month. Physically she has been doing well. Voice remains the same with occasional fluctuations and some vocal fatigue after longer conversations. No prolonged episodes of losing her voice. Swallowing well. 11/9/22 - 6 mos follow-up, she continues to do well.   Had recent imaging and lab work with endocrinology last month.  Thyroid ultrasound showing no residual thyroid tissue and no adenopathy. TSH remains suppressed. Thyroglobulin level remains stable at 0.4. Her voice remains the same. Really no changes or issues. Breathing well, no shortness of breath during exercise. Review of Systems  Review of Systems   Constitutional:  Negative for chills and fever. HENT:  Negative for ear pain, hearing loss, nosebleeds and tinnitus. Eyes:  Negative for blurred vision and double vision. Respiratory:  Negative for cough, sputum production and shortness of breath. Cardiovascular:  Negative for chest pain and palpitations. Gastrointestinal:  Negative for heartburn, nausea and vomiting. Musculoskeletal:  Positive for myalgias. Negative for joint pain and neck pain. Skin: Negative. Neurological:  Negative for dizziness, speech change, weakness and headaches. Endo/Heme/Allergies:  Negative for environmental allergies. Does not bruise/bleed easily. Psychiatric/Behavioral:  Negative for memory loss. The patient does not have insomnia. Past Medical History:   Diagnosis Date    Arthritis      Prior to Admission medications    Medication Sig Start Date End Date Taking? Authorizing Provider   b complex vitamins tablet Take 1 Tablet by mouth as needed. Yes Provider, Historical   Unithroid 88 mcg tablet BMN  TAKE 1 TABLET BY MOUTH DAILY BEFORE BREAKFAST 4/26/22  Yes Tanya Arvizu MD   atorvastatin (LIPITOR) 20 mg tablet Take 20 mg by mouth daily. Yes Provider, Historical   calcium-cholecalciferol, d3, 600-125 mg-unit tab Take 1 Tablet by mouth daily. Yes Provider, Historical   aspirin 81 mg chewable tablet Take 81 mg by mouth as needed. Provider, Historical            Objective:     Visit Vitals  /64 (BP 1 Location: Left arm, BP Patient Position: Sitting, BP Cuff Size: Large adult)   Pulse 82   Wt 145 lb (65.8 kg)   SpO2 97%   BMI 28.32 kg/m²        Physical Exam  Vitals reviewed. Constitutional:       General: She is awake. She is not in acute distress. Appearance: Normal appearance. She is well-groomed and normal weight. HENT:      Head: Normocephalic and atraumatic. Jaw: There is normal jaw occlusion. No trismus, tenderness or malocclusion. Salivary Glands: Right salivary gland is not diffusely enlarged or tender. Left salivary gland is not diffusely enlarged or tender. Right Ear: Hearing, tympanic membrane, ear canal and external ear normal.      Left Ear: Hearing, tympanic membrane, ear canal and external ear normal.      Nose: No nasal deformity, septal deviation or mucosal edema. Right Nostril: No epistaxis. Left Nostril: No epistaxis. Right Turbinates: Not enlarged, swollen or pale. Left Turbinates: Not enlarged, swollen or pale. Right Sinus: No maxillary sinus tenderness or frontal sinus tenderness. Left Sinus: No maxillary sinus tenderness or frontal sinus tenderness. Mouth/Throat:      Lips: Pink. No lesions. Mouth: Mucous membranes are moist. No oral lesions. Dentition: No dental caries. Tongue: No lesions. Palate: No mass and lesions. Pharynx: Oropharynx is clear. Uvula midline. No oropharyngeal exudate or posterior oropharyngeal erythema. Tonsils: No tonsillar exudate. 0 on the right. 0 on the left. Eyes:      General: Vision grossly intact. Extraocular Movements: Extraocular movements intact. Right eye: No nystagmus. Left eye: No nystagmus. Conjunctiva/sclera: Conjunctivae normal.      Pupils: Pupils are equal, round, and reactive to light. Neck:      Thyroid: No thyroid mass, thyromegaly or thyroid tenderness. Trachea: Trachea normal. No tracheal tenderness or tracheal deviation.         Comments: Left neck well-healed incision  No mass or adenopathy felt, no mass in the thyroid bed  Voice is overall stable, some mild to moderate pitch changes  Cardiovascular: Rate and Rhythm: Normal rate and regular rhythm. Pulmonary:      Effort: Pulmonary effort is normal. No respiratory distress. Breath sounds: No stridor. Musculoskeletal:         General: No swelling or tenderness. Normal range of motion. Cervical back: No edema or erythema. No muscular tenderness. Lymphadenopathy:      Cervical: No cervical adenopathy. Skin:     General: Skin is warm and dry. Findings: No lesion or rash. Neurological:      General: No focal deficit present. Mental Status: She is alert and oriented to person, place, and time. Mental status is at baseline. Coordination: Romberg sign negative. Gait: Gait is intact. Psychiatric:         Mood and Affect: Mood normal.         Behavior: Behavior normal. Behavior is cooperative. Assessment/Plan:     Encounter Diagnoses   Name Primary? Papillary thyroid carcinoma (HCC) Yes    Postoperative hypothyroidism     Vocal cord paralysis, unilateral complete      Papillary thyroid carcinoma  -now 3.5 years out from total thyroidectomy and neck dissection. Clinically CLYDE today. Had negative thyroid ultrasound in October 2022 and negative whole-body iodine scan in June 2021. Most recent thyroglobulin remains stable at 0.4. She continues follow-up with Dr. Ella Taylor with me in 6 months for this. Vocal paralysis -her dysphonia remained stable. She remains with a stable cord paralysis. Voice is not breathy nor weak. At this point I do not think she would benefit from further vocal cord injection. We can always revisit this option in the future if vocal quality changes significantly. No orders of the defined types were placed in this encounter. Follow-up and Dispositions    Return in about 6 months (around 5/9/2023). Musa Arredondo MD, [address] [facility]  [address] [place] [address] [facility] [address]  [facility] [phone]. [phone]

## 2023-05-04 LAB
T4 FREE SERPL-MCNC: 1.71 NG/DL (ref 0.82–1.77)
THYROGLOB AB SERPL-ACNC: <1 IU/ML
THYROGLOB SERPL-MCNC: 0.7 NG/ML
THYROGLOB SERPL-MCNC: NORMAL NG/ML
TSH SERPL DL<=0.005 MIU/L-ACNC: 0.15 UIU/ML (ref 0.45–4.5)

## 2023-05-05 ENCOUNTER — OFFICE VISIT (OUTPATIENT)
Dept: ENDOCRINOLOGY | Age: 73
End: 2023-05-05
Payer: COMMERCIAL

## 2023-05-05 VITALS
HEIGHT: 60 IN | SYSTOLIC BLOOD PRESSURE: 119 MMHG | TEMPERATURE: 96.2 F | OXYGEN SATURATION: 99 % | WEIGHT: 144.2 LBS | BODY MASS INDEX: 28.31 KG/M2 | DIASTOLIC BLOOD PRESSURE: 67 MMHG | HEART RATE: 90 BPM

## 2023-05-05 DIAGNOSIS — E89.0 POSTOPERATIVE HYPOTHYROIDISM: Primary | ICD-10-CM

## 2023-05-05 DIAGNOSIS — C73 THYROID CANCER (HCC): ICD-10-CM

## 2023-05-05 DIAGNOSIS — R73.03 PRE-DIABETES: ICD-10-CM

## 2023-05-05 PROCEDURE — 99215 OFFICE O/P EST HI 40 MIN: CPT | Performed by: INTERNAL MEDICINE

## 2023-05-05 PROCEDURE — 1123F ACP DISCUSS/DSCN MKR DOCD: CPT | Performed by: INTERNAL MEDICINE

## 2023-05-05 RX ORDER — LEVOTHYROXINE SODIUM 88 UG/1
TABLET ORAL
Qty: 90 TABLET | Refills: 3 | Status: SHIPPED | OUTPATIENT
Start: 2023-05-05

## 2023-05-09 DIAGNOSIS — R73.03 PRE-DIABETES: ICD-10-CM

## 2023-05-09 DIAGNOSIS — E89.0 POSTOPERATIVE HYPOTHYROIDISM: Primary | ICD-10-CM

## 2023-05-09 DIAGNOSIS — C73 THYROID CANCER (HCC): ICD-10-CM

## 2023-05-16 RX ORDER — ATORVASTATIN CALCIUM 20 MG/1
20 TABLET, FILM COATED ORAL DAILY
COMMUNITY

## 2023-05-16 RX ORDER — ASPIRIN 81 MG/1
81 TABLET, CHEWABLE ORAL PRN
COMMUNITY

## 2023-05-16 RX ORDER — LEVOTHYROXINE SODIUM 88 UG/1
TABLET ORAL
COMMUNITY
Start: 2023-05-05

## 2023-08-08 ENCOUNTER — OFFICE VISIT (OUTPATIENT)
Age: 73
End: 2023-08-08
Payer: COMMERCIAL

## 2023-08-08 DIAGNOSIS — C73 MALIGNANT NEOPLASM OF THYROID GLAND (HCC): Primary | ICD-10-CM

## 2023-08-08 DIAGNOSIS — R49.0 DYSPHONIA: ICD-10-CM

## 2023-08-08 DIAGNOSIS — E89.0 POSTPROCEDURAL HYPOTHYROIDISM: ICD-10-CM

## 2023-08-08 DIAGNOSIS — J38.01 PARALYSIS OF VOCAL CORDS AND LARYNX, UNILATERAL: ICD-10-CM

## 2023-08-08 PROCEDURE — 1123F ACP DISCUSS/DSCN MKR DOCD: CPT | Performed by: OTOLARYNGOLOGY

## 2023-08-08 PROCEDURE — 99214 OFFICE O/P EST MOD 30 MIN: CPT | Performed by: OTOLARYNGOLOGY

## 2023-08-08 NOTE — PROGRESS NOTES
Subjective:      Jaqui Ricardo    81 y.o.    1950       Followup Visit    Location -thyroid      Quality -papillary thyroid cancer stage IV      Severity -moderate to severe      Duration -approximately 2 years      Timing -chronic     Context -following up today, patient is nearly 2 years out from total thyroidectomy and neck dissection for stage IV papillary thyroid cancer of the left thyroid with central neck and left lateral neck lymph nodes and invasion of the recurrent laryngeal  nerve, simultaneous left vocal cord injection. She has been doing overall well has been following up with endocrinology last seen in October. At that time things were relatively stable. She has no new complaints today other than past couple of months  some intermittent left ear symptoms no pain occasional feeling of something moving in the ear and also radiating to the temple. She does work out quite a bit has been swimming a lot, does complain of the left neck being a little stiff, no change in hearing      Modifying Features -none      Associated symptoms/signs -as above, does have a left-sided tooth that ultimately needs to be pulled     11/3/2021 -6-month follow-up thyroid cancer -patient overall has been doing well. She had thyroid ultrasound last month and whole body iodine  scan in June. Maintains on Unithroid. Voice overall is stable but she does admit to having some fluctuations at times and occasionally some vocal fatigue. 5/11/22 -6-month follow-up, stage IV papillary thyroid carcinoma. Patient states overall she has been doing well. She continues her follow-up with endocrinology was last seen there just last month. Physically she has been doing well. Voice remains  the same with occasional fluctuations and some vocal fatigue after longer conversations. No prolonged episodes of losing her voice. Swallowing well. 11/9/22 - 6 mos follow-up, she continues to do well.   Had recent imaging and

## 2023-10-12 ENCOUNTER — TRANSCRIBE ORDERS (OUTPATIENT)
Facility: HOSPITAL | Age: 73
End: 2023-10-12

## 2023-10-12 ENCOUNTER — HOSPITAL ENCOUNTER (OUTPATIENT)
Facility: HOSPITAL | Age: 73
Discharge: HOME | End: 2023-10-12
Attending: INTERNAL MEDICINE
Payer: COMMERCIAL

## 2023-10-12 ENCOUNTER — HOSPITAL ENCOUNTER (OUTPATIENT)
Facility: HOSPITAL | Age: 73
End: 2023-10-12
Attending: INTERNAL MEDICINE
Payer: COMMERCIAL

## 2023-10-12 DIAGNOSIS — E89.0 POST-OPERATIVE HYPOTHYROIDISM: ICD-10-CM

## 2023-10-12 DIAGNOSIS — Z12.31 OTHER SCREENING MAMMOGRAM: Primary | ICD-10-CM

## 2023-10-12 DIAGNOSIS — Z12.31 OTHER SCREENING MAMMOGRAM: ICD-10-CM

## 2023-10-12 DIAGNOSIS — C73 THYROID CANCER (HCC): ICD-10-CM

## 2023-10-12 PROCEDURE — 76536 US EXAM OF HEAD AND NECK: CPT

## 2023-10-12 PROCEDURE — 77063 BREAST TOMOSYNTHESIS BI: CPT

## 2023-11-08 ENCOUNTER — NURSE ONLY (OUTPATIENT)
Age: 73
End: 2023-11-08

## 2023-11-08 DIAGNOSIS — R73.03 PRE-DIABETES: ICD-10-CM

## 2023-11-08 DIAGNOSIS — C73 THYROID CANCER (HCC): ICD-10-CM

## 2023-11-08 DIAGNOSIS — E89.0 POSTOPERATIVE HYPOTHYROIDISM: ICD-10-CM

## 2023-11-09 LAB — TSH SERPL DL<=0.005 MIU/L-ACNC: 0.16 UIU/ML (ref 0.45–4.5)

## 2023-11-15 ENCOUNTER — OFFICE VISIT (OUTPATIENT)
Age: 73
End: 2023-11-15
Payer: COMMERCIAL

## 2023-11-15 VITALS
HEART RATE: 96 BPM | RESPIRATION RATE: 20 BRPM | SYSTOLIC BLOOD PRESSURE: 117 MMHG | OXYGEN SATURATION: 98 % | BODY MASS INDEX: 28.82 KG/M2 | WEIGHT: 146.8 LBS | TEMPERATURE: 97.5 F | HEIGHT: 60 IN | DIASTOLIC BLOOD PRESSURE: 73 MMHG

## 2023-11-15 DIAGNOSIS — E89.0 POSTOPERATIVE HYPOTHYROIDISM: Primary | ICD-10-CM

## 2023-11-15 DIAGNOSIS — C73 THYROID CANCER (HCC): ICD-10-CM

## 2023-11-15 DIAGNOSIS — M81.0 SENILE OSTEOPOROSIS: ICD-10-CM

## 2023-11-15 PROCEDURE — 99214 OFFICE O/P EST MOD 30 MIN: CPT | Performed by: INTERNAL MEDICINE

## 2023-11-15 PROCEDURE — 1123F ACP DISCUSS/DSCN MKR DOCD: CPT | Performed by: INTERNAL MEDICINE

## 2023-11-15 RX ORDER — LEVOTHYROXINE SODIUM 88 UG/1
88 TABLET ORAL DAILY
Qty: 90 TABLET | Refills: 3 | Status: SHIPPED | OUTPATIENT
Start: 2023-11-15

## 2023-11-15 RX ORDER — VITAMIN B COMPLEX
1 CAPSULE ORAL DAILY
COMMUNITY

## 2023-11-15 NOTE — PROGRESS NOTES
Zackery Munoz MD FACE       HISTORY OF PRESENT ILLNESS   Jaqui Jackson is a 72 y.o.  female. HPI   Patient is here for follow up  for management of Papillary  Thyroid cancer   And hypothyroidism    From  May 2023    She is doing well   She is compliant with unithroid         May 2023       She is doing well, but for hoarse voice    She has no hyperthyroid symptoms    As an active life style             April 2022       Gained 1 lb    She had good time in the interim , visited [place]   She has a nasal twang ( ran in cold [place]) - bubble run she says            Old history :    Patient in general good state of health has Noticed hoarse voice 4 weeks ago    Got treated with antibiotics for a week, but hoarseness persisted    She saw Dr. Alvaro Pisano and he ordered Ct/ usg neck    He found left vocal cord palsy on laryngoscopy    He ordered for FNA of thyroid mass and left supraclavicular LN  Which revealed PTC   Ct scan showed locally invasive metastatic tissues involvement    She underwent near total thyroidectomy with radical dissection of ipsilateral LN and central dissection of LN surgery  On May 8 2019 at [facility]           Review of Systems    Constitutional: Negative. Hoarse voice on and off    Psychiatric/Behavioral: Negative. Physical Exam    Constitutional: She is oriented to person, place, and time. She appears well-developed and well-nourished. Neck: Normal range of motion. Neck supple. (healed surgical scar for thyroidectomy ) present. Psychiatric: She has a normal mood and affect. Labs    Thyroid    Lab Results   Component Value Date    TSH 0.163 (L) 11/08/2023    FT3 6.1 (H) 05/21/2019    T4FREE 1.71 04/28/2023         ASSESSMENT and PLAN     1. THYROID CANCER  :  PTC METASTATIC, LOCALLY INVASIVE    S/P NEAR TOTAL THYROIDECTOMY  WITH LEFT SIDED RADICAL NECK DISSECTION    surg path :  May 8 2019  - Left  lobe PTC 4 cm ;

## 2023-11-15 NOTE — PROGRESS NOTES
Gurpreet Da Silva is a 72 y.o. female here for   Chief Complaint   Patient presents with    Thyroid Problem    Follow-up     Pre-diabetes        1. Have you been to the ER, urgent care clinic since your last visit? Hospitalized since your last visit? - no    2. Have you seen or consulted any other health care providers outside of the [address] [place] [address] Avenue since your last visit?   Include any pap smears or colon screening.- no

## 2023-11-15 NOTE — PATIENT INSTRUCTIONS
SPECIFIC INSTRUCTIONS BELOW     Royer harry or [facility] - b12  ( b complex  )     ---------------------------------------------------------------------------------------------------------------        unithroid  88  mcg  A day,   BRAND  Every day ,    on empty stomach with water only, no other meds or food or drinks   For next half hour      Take any kind of vitamins, calcium, iron   Pills  4 hours later           -------------PAY ATTENTION TO THESE GENERAL INSTRUCTIONS -----------------      - The medications prescribed at this visit will not be available at pharmacy until 6 pm       - YOUR MED LIST IS NOT UP TO DATE AS SOME CHANGES ARE BEING MADE AFTER THE VISIT - FOLLOW SPECIFIC INSTRUCTIONS  ABOVE     -ANY tests other than blood work, which you opt to do  outside the  [facility] imaging facilities, you are responsible for prior authorizations if  required    - [address] UP TO DATE ON YOUR AVS- PLEASE IGNORE     Results     *Normal results will not be notified by a phone call starting January 1 2021   *If you have an upcoming visit, the results will be discussed at the visit   *Please sign up for MY CHART if you want access to your lab and test results  *Abnormal results which require immediate attention will be notified by phone call   *Abnormal results which do not require immediate assistance will be notified in 1-2 weeks       Refills    -    have your pharmacy send us a refill request . Refills are done max for one year and a visit is a must before refills are extended    Follow up appointments -  highly encourage you to make it when you are checking out. We can accommodate you into the schedule based on your clinical situation, but not for extending refills beyond a year. Labs are important to give refills and is important to get labs before the visit     Phone calls  -  Allow  24 hrs.  for non-urgent calls to be returned  Prior authorization - It may take 2-4 weeks to

## 2023-11-21 LAB
THYROGLOB AB SERPL-ACNC: <1 IU/ML
THYROGLOB SERPL-MCNC: 0.3 NG/ML
THYROGLOB SERPL-MCNC: NORMAL NG/ML

## 2023-12-04 ENCOUNTER — HOSPITAL ENCOUNTER (OUTPATIENT)
Facility: HOSPITAL | Age: 73
Discharge: HOME | End: 2023-12-07
Attending: INTERNAL MEDICINE
Payer: COMMERCIAL

## 2023-12-04 DIAGNOSIS — M81.0 SENILE OSTEOPOROSIS: ICD-10-CM

## 2023-12-04 DIAGNOSIS — E89.0 POSTOPERATIVE HYPOTHYROIDISM: ICD-10-CM

## 2023-12-04 PROCEDURE — 77080 DXA BONE DENSITY AXIAL: CPT

## 2024-01-31 ENCOUNTER — OFFICE VISIT (OUTPATIENT)
Age: 74
End: 2024-01-31
Payer: COMMERCIAL

## 2024-01-31 VITALS
HEART RATE: 84 BPM | WEIGHT: 148 LBS | DIASTOLIC BLOOD PRESSURE: 71 MMHG | SYSTOLIC BLOOD PRESSURE: 123 MMHG | HEIGHT: 60 IN | BODY MASS INDEX: 29.06 KG/M2 | TEMPERATURE: 97 F | OXYGEN SATURATION: 95 %

## 2024-01-31 DIAGNOSIS — E89.0 POSTPROCEDURAL HYPOTHYROIDISM: ICD-10-CM

## 2024-01-31 DIAGNOSIS — C73 THYROID CANCER (HCC): Primary | ICD-10-CM

## 2024-01-31 PROCEDURE — 99214 OFFICE O/P EST MOD 30 MIN: CPT | Performed by: INTERNAL MEDICINE

## 2024-01-31 PROCEDURE — 1123F ACP DISCUSS/DSCN MKR DOCD: CPT | Performed by: INTERNAL MEDICINE

## 2024-01-31 NOTE — PATIENT INSTRUCTIONS
SPECIFIC INSTRUCTIONS BELOW     March  first week  2024    for Thyrogen to be scheduled      ---------------------------------------------------------------------------------------------------------------        unithroid  88  mcg  A day,   BRAND  Every day ,    on empty stomach with water only, no other meds or food or drinks   For next half hour      Take any kind of vitamins, calcium, iron   Pills  4 hours later           -------------PAY ATTENTION TO THESE GENERAL INSTRUCTIONS -----------------      - The medications prescribed at this visit will not be available at pharmacy until 6 pm       - YOUR MED LIST IS NOT UP TO DATE AS SOME CHANGES ARE BEING MADE AFTER THE VISIT - FOLLOW SPECIFIC INSTRUCTIONS  ABOVE     -ANY tests other than blood work, which you opt to do  outside the  [facility] imaging facilities, you are responsible for prior authorizations if  required    - [facility] [employer] IS NOT GOING TO BE UP TO DATE ON YOUR AVS- PLEASE IGNORE     Results     *Normal results will not be notified by a phone call starting January 1 2021   *If you have an upcoming visit, the results will be discussed at the visit   *Please sign up for MY CHART if you want access to your lab and test results  *Abnormal results which require immediate attention will be notified by phone call   *Abnormal results which do not require immediate assistance will be notified in 1-2 weeks       Refills    -    have your pharmacy send us a refill request . Refills are done max for one year and a visit is a must before refills are extended    Follow up appointments -  highly encourage you to make it when you are checking out. We can accommodate you into the schedule based on your clinical situation, but not for extending refills beyond a year. Labs are important to give refills and is important to get labs before the visit     Phone calls  -  Allow  24 hrs. for non-urgent calls to be returned  Prior authorization - It may take 2-4 weeks

## 2024-01-31 NOTE — PROGRESS NOTES
Jaqui Ricardo is a 73 y.o. female here for   Chief Complaint   Patient presents with    Thyroid Problem       1. Have you been to the ER, urgent care clinic since your last visit?  Hospitalized since your last visit? -no    2. Have you seen or consulted any other health care providers outside of the [facility] since your last visit?  Include any pap smears or colon screening.-no

## 2024-02-02 NOTE — PROGRESS NOTES
[facility] DIABETES AND ENDOCRINOLOGY                Kylie Demarco MD FACE       HISTORY OF PRESENT ILLNESS   Jaqui Ricardo is a 73 y.o.  female.   HPI   Patient is here for follow up  for management of Papillary  Thyroid cancer   And hypothyroidism    From  Nov 2023    She is doing well   She is compliant with unithroid   She is here to discuss the labs  and plan for  WALKER therapy         May 2023       She is doing well, but for hoarse voice    She has no hyperthyroid symptoms    As an active life style             April 2022       Gained 1 lb    She had good time in the interim , visited [place]   She has a nasal twang ( ran in cold mendez) - bubble run she says            Old history :    Patient in general good state of health has Noticed hoarse voice 4 weeks ago    Got treated with antibiotics for a week, but hoarseness persisted    She saw Dr. Pacheco and he ordered Ct/ usg neck    He found left vocal cord palsy on laryngoscopy    He ordered for FNA of thyroid mass and left supraclavicular LN  Which revealed PTC   Ct scan showed locally invasive metastatic tissues involvement    She underwent near total thyroidectomy with radical dissection of ipsilateral LN and central dissection of LN surgery  On May 8 2019 at [facility]           Review of Systems    Constitutional: Negative.     Hoarse voice on and off    Psychiatric/Behavioral: Negative.           Physical Exam    Constitutional: She is oriented to person, place, and time. She appears well-developed and well-nourished.    Neck: Normal range of motion. Neck supple. (healed surgical scar for thyroidectomy ) present.    Psychiatric: She has a normal mood and affect.         Labs    Lab Results   Component Value Date    TSH 0.163 (L) 11/08/2023    FT3 6.1 (H) 05/21/2019    T4FREE 1.71 04/28/2023            ASSESSMENT and PLAN     1. THYROID CANCER  :  PTC METASTATIC, LOCALLY INVASIVE    S/P NEAR TOTAL THYROIDECTOMY  WITH LEFT SIDED RADICAL NECK DISSECTION

## 2024-03-05 DIAGNOSIS — C73 THYROID CANCER (HCC): Primary | ICD-10-CM

## 2024-03-06 DIAGNOSIS — C73 THYROID CANCER (HCC): Primary | ICD-10-CM

## 2024-03-18 ENCOUNTER — HOSPITAL ENCOUNTER (OUTPATIENT)
Facility: HOSPITAL | Age: 74
Setting detail: INFUSION SERIES
Discharge: HOME | End: 2024-03-18
Payer: COMMERCIAL

## 2024-03-18 VITALS
SYSTOLIC BLOOD PRESSURE: 126 MMHG | HEART RATE: 78 BPM | RESPIRATION RATE: 18 BRPM | DIASTOLIC BLOOD PRESSURE: 79 MMHG | TEMPERATURE: 98.1 F

## 2024-03-18 DIAGNOSIS — C73 PAPILLARY THYROID CARCINOMA (HCC): Primary | ICD-10-CM

## 2024-03-18 PROCEDURE — 96372 THER/PROPH/DIAG INJ SC/IM: CPT

## 2024-03-18 PROCEDURE — 2580000003 HC RX 258: Performed by: INTERNAL MEDICINE

## 2024-03-18 PROCEDURE — 6360000002 HC RX W HCPCS: Performed by: INTERNAL MEDICINE

## 2024-03-18 RX ADMIN — THYROTROPIN ALFA 0.9 MG: 0.9 INJECTION, POWDER, FOR SOLUTION INTRAMUSCULAR at 12:07

## 2024-03-18 ASSESSMENT — PAIN SCALES - GENERAL: PAINLEVEL_OUTOF10: 0

## 2024-03-18 NOTE — PROGRESS NOTES
hospitals Peds/Adult Note                       Date: 2024    Name: Jaqui Ricardo    MRN: [id number]         : 1950    1145 Patient arrives for Thyrogen IM Injection (1 of 2) without acute problems. Please see Epic for complete assessment and education provided.    Vital signs stable throughout and prior to discharge. Patient tolerated procedure well and was discharged without incident.  Patient is aware of next hospitals appointment on 2024.  Appointment card give to the Patient.       Ms. Ricardo's vitals were reviewed prior to and after treatment.   Patient Vitals for the past 12 hrs:   Temp Pulse Resp BP   24 1145 98.1 °F (36.7 °C) 78 18 126/79         Medications given:   Medications Administered         thyrotropin miguel a (THYROGEN) 0.9 mg in sterile water 1 mL injection Admin Date  2024 Action  Given Dose  0.9 mg Route  IntraMUSCular Administered By  Loreta Narayanan RN          Ms. Ricardo tolerated the infusion, and had no complaints.    [facility]. [facility].     Future Appointments   Date Time Provider Department Center   3/19/2024 11:30 AM B4 PEDS [facility]   3/20/2024  9:00 AM [facility] NM INJ RM 1 [facility]   3/22/2024  8:30 AM SPEC CDE LAB CDE BS AMB   3/22/2024  1:00 PM [facility] NM RM 3 [facility]   3/27/2024 10:45 AM Zackary Pacheco MD REP BS AMB   2024 11:15 AM SPEC CDE LAB CDE BS AMB   6/10/2024 11:15 AM Kylie Demarco MD CDE BS AMB       LORETA NARAYANAN RN  2024  1:01 PM

## 2024-03-19 ENCOUNTER — HOSPITAL ENCOUNTER (OUTPATIENT)
Facility: HOSPITAL | Age: 74
Setting detail: INFUSION SERIES
Discharge: HOME | End: 2024-03-19
Payer: COMMERCIAL

## 2024-03-19 VITALS
HEART RATE: 78 BPM | SYSTOLIC BLOOD PRESSURE: 138 MMHG | RESPIRATION RATE: 18 BRPM | DIASTOLIC BLOOD PRESSURE: 80 MMHG | TEMPERATURE: 97.4 F

## 2024-03-19 DIAGNOSIS — C73 PAPILLARY THYROID CARCINOMA (HCC): Primary | ICD-10-CM

## 2024-03-19 PROCEDURE — 2580000003 HC RX 258: Performed by: INTERNAL MEDICINE

## 2024-03-19 PROCEDURE — 6360000002 HC RX W HCPCS: Performed by: INTERNAL MEDICINE

## 2024-03-19 PROCEDURE — 96372 THER/PROPH/DIAG INJ SC/IM: CPT

## 2024-03-19 RX ADMIN — THYROTROPIN ALFA 0.9 MG: 0.9 INJECTION, POWDER, FOR SOLUTION INTRAMUSCULAR at 11:59

## 2024-03-19 ASSESSMENT — PAIN SCALES - GENERAL: PAINLEVEL_OUTOF10: 0

## 2024-03-19 NOTE — PROGRESS NOTES
[facility] Peds/Adult Note                       Date: 2024    Name: Jaqui Ricardo    MRN: [id number]         : 1950    1150 Patient arrives for Thyrogen Dose 2 of 2 without acute problems. Please see EPIC for complete assessment and education provided.    Vital signs stable throughout and prior to discharge. Patient tolerated procedure well and was discharged without incident.  Patient is aware of no future [facility] appointments. Appointment card give to the Patient.      Ms. Ricardo's vitals were reviewed prior to and after treatment.   Patient Vitals for the past 12 hrs:   Temp Pulse Resp BP   24 1151 97.4 °F (36.3 °C) 78 18 138/80       Medications given:   Medications Administered         thyrotropin miguel a (THYROGEN) 0.9 mg in sterile water 1 mL injection Admin Date  2024 Action  Given Dose  0.9 mg Route  IntraMUSCular Administered By  Loreta Chávez RN            Ms. Ricardo tolerated the treatment and had no complaints.    Ms. Ricardo was [facility] in stable condition. Discharge Instructions provided to patient, patient verbalized understanding.     Future Appointments   Date Time Provider Department Center   3/20/2024  9:00 AM [facility] NM INJ RM 1 [facility]   3/22/2024  8:30 AM SPEC CDE LAB CDE BS AMB   3/22/2024  1:00 PM [facility] NM RM 3 [facility]   3/27/2024 10:45 AM Zackary Pacheco MD REP BS AMB   2024 11:15 AM SPEC CDE LAB CDE BS AMB   6/10/2024 11:15 AM Kylie Demarco MD CDE BS AMB       Loreta Chávez RN  2024  1:20 PM

## 2024-03-20 ENCOUNTER — HOSPITAL ENCOUNTER (OUTPATIENT)
Facility: HOSPITAL | Age: 74
Discharge: HOME | End: 2024-03-23
Payer: COMMERCIAL

## 2024-03-20 DIAGNOSIS — C73 THYROID CANCER (HCC): ICD-10-CM

## 2024-03-20 PROCEDURE — A9528 IODINE I-131 IODIDE CAP, DX: HCPCS | Performed by: INTERNAL MEDICINE

## 2024-03-20 PROCEDURE — 78018 THYROID MET IMAGING BODY: CPT

## 2024-03-20 PROCEDURE — 3430000000 HC RX DIAGNOSTIC RADIOPHARMACEUTICAL: Performed by: INTERNAL MEDICINE

## 2024-03-20 RX ADMIN — SODIUM IODIDE I 131 10.8 MICRO CURIE: 100 CAPSULE ORAL at 09:35

## 2024-03-22 ENCOUNTER — HOSPITAL ENCOUNTER (OUTPATIENT)
Facility: HOSPITAL | Age: 74
Discharge: HOME | End: 2024-03-25

## 2024-03-22 ENCOUNTER — NURSE ONLY (OUTPATIENT)
Age: 74
End: 2024-03-22

## 2024-03-22 DIAGNOSIS — C73 THYROID CANCER (HCC): ICD-10-CM

## 2024-03-22 LAB — TSH SERPL DL<=0.05 MIU/L-ACNC: 31.9 UIU/ML (ref 0.36–3.74)

## 2024-03-27 ENCOUNTER — OFFICE VISIT (OUTPATIENT)
Age: 74
End: 2024-03-27
Payer: COMMERCIAL

## 2024-03-27 VITALS
RESPIRATION RATE: 18 BRPM | DIASTOLIC BLOOD PRESSURE: 80 MMHG | HEART RATE: 80 BPM | HEIGHT: 60 IN | WEIGHT: 147 LBS | BODY MASS INDEX: 28.86 KG/M2 | SYSTOLIC BLOOD PRESSURE: 122 MMHG | OXYGEN SATURATION: 98 %

## 2024-03-27 DIAGNOSIS — C73 MALIGNANT NEOPLASM OF THYROID GLAND (HCC): Primary | ICD-10-CM

## 2024-03-27 DIAGNOSIS — J38.01 PARALYSIS OF VOCAL CORDS AND LARYNX, UNILATERAL: ICD-10-CM

## 2024-03-27 DIAGNOSIS — R49.0 DYSPHONIA: ICD-10-CM

## 2024-03-27 DIAGNOSIS — R09.82 PND (POST-NASAL DRIP): ICD-10-CM

## 2024-03-27 DIAGNOSIS — E89.0 POSTPROCEDURAL HYPOTHYROIDISM: ICD-10-CM

## 2024-03-27 PROCEDURE — 99214 OFFICE O/P EST MOD 30 MIN: CPT | Performed by: OTOLARYNGOLOGY

## 2024-03-27 PROCEDURE — 1123F ACP DISCUSS/DSCN MKR DOCD: CPT | Performed by: OTOLARYNGOLOGY

## 2024-03-27 NOTE — PROGRESS NOTES
oropharyngeal exudate or posterior oropharyngeal erythema.       Tonsils: No tonsillar exudate. 0 on the right. 0  on the left.    Eyes:       General: Vision grossly intact.       Extraocular Movements: Extraocular movements intact.       Right eye: No nystagmus.       Left eye: No nystagmus.       Conjunctiva/sclera: Conjunctivae normal.       Pupils: Pupils are equal, round, and reactive to light.    Neck:       Thyroid: No thyroid mass, thyromegaly or thyroid tenderness.       Trachea: Trachea normal. No tracheal tenderness or tracheal deviation.            Comments: Left neck well-healed incision   No mass or adenopathy felt, no mass in the thyroid bed   Voice is overall stable, some mild to moderate pitch changes   Cardiovascular:       Rate and Rhythm: Normal rate and regular rhythm.    Pulmonary:       Effort: Pulmonary effort is normal. No respiratory distress.       Breath sounds: No stridor.     Musculoskeletal:          General: No swelling or tenderness. Normal range of motion.       Cervical back: No edema or erythema. No muscular tenderness.     Lymphadenopathy:       Cervical: No cervical adenopathy.    Skin:      General: Skin is warm and dry.       Findings: No lesion or rash.    Neurological:       General: No focal deficit present.       Mental Status: She is alert and oriented to person, place, and time. Mental status is at baseline.       Coordination: Romberg sign negative.       Gait: Gait is intact.    Psychiatric:          Mood and Affect: Mood normal.          Behavior: Behavior normal. Behavior is cooperative.        EXAM: NM Thyroid Metastases Scan Whole Body March 2024     INDICATION: Malignant neoplasm of thyroid gland      Comparison Scan: 6/26/2019.  Correlation Imaging Studies: None.     TECHNIQUE: Anterior and posterior whole body scintigraphic planar images are  obtained 48 hours following po administration of 10.8 mCi Iodine-131 following  appropriate Thyrogen stimulation.

## 2024-03-28 LAB
THYROGLOBULIN (ICMA): 5.6 NG/ML
THYROGLOBULIN (TG-RIA): NORMAL NG/ML
THYROGLOBULIN AB: <1 IU/ML

## 2024-03-29 ENCOUNTER — ANESTHESIA (OUTPATIENT)
Facility: HOSPITAL | Age: 74
End: 2024-03-29
Payer: COMMERCIAL

## 2024-03-29 ENCOUNTER — ANESTHESIA EVENT (OUTPATIENT)
Facility: HOSPITAL | Age: 74
End: 2024-03-29
Payer: COMMERCIAL

## 2024-03-29 ENCOUNTER — HOSPITAL ENCOUNTER (OUTPATIENT)
Facility: HOSPITAL | Age: 74
Discharge: HOME | End: 2024-03-29
Attending: ORTHOPAEDIC SURGERY | Admitting: ORTHOPAEDIC SURGERY
Payer: COMMERCIAL

## 2024-03-29 VITALS
RESPIRATION RATE: 16 BRPM | DIASTOLIC BLOOD PRESSURE: 72 MMHG | OXYGEN SATURATION: 100 % | SYSTOLIC BLOOD PRESSURE: 121 MMHG | HEART RATE: 63 BPM | TEMPERATURE: 98.1 F

## 2024-03-29 DIAGNOSIS — M67.432 GANGLION OF LEFT WRIST: ICD-10-CM

## 2024-03-29 PROBLEM — M67.40 GANGLION OF JOINT: Status: ACTIVE | Noted: 2024-03-29

## 2024-03-29 LAB
HCT VFR BLD AUTO: 41.3 % (ref 35–47)
HGB BLD-MCNC: 13.9 G/DL (ref 11.5–16)

## 2024-03-29 PROCEDURE — 3600000002 HC SURGERY LEVEL 2 BASE: Performed by: ORTHOPAEDIC SURGERY

## 2024-03-29 PROCEDURE — 7100000010 HC PHASE II RECOVERY - FIRST 15 MIN: Performed by: ORTHOPAEDIC SURGERY

## 2024-03-29 PROCEDURE — 6360000002 HC RX W HCPCS: Performed by: ORTHOPAEDIC SURGERY

## 2024-03-29 PROCEDURE — 6360000002 HC RX W HCPCS: Performed by: NURSE ANESTHETIST, CERTIFIED REGISTERED

## 2024-03-29 PROCEDURE — 7100000001 HC PACU RECOVERY - ADDTL 15 MIN: Performed by: ORTHOPAEDIC SURGERY

## 2024-03-29 PROCEDURE — 2709999900 HC NON-CHARGEABLE SUPPLY: Performed by: ORTHOPAEDIC SURGERY

## 2024-03-29 PROCEDURE — 85018 HEMOGLOBIN: CPT

## 2024-03-29 PROCEDURE — 3600000012 HC SURGERY LEVEL 2 ADDTL 15MIN: Performed by: ORTHOPAEDIC SURGERY

## 2024-03-29 PROCEDURE — 99152 MOD SED SAME PHYS/QHP 5/>YRS: CPT | Performed by: ORTHOPAEDIC SURGERY

## 2024-03-29 PROCEDURE — 3700000000 HC ANESTHESIA ATTENDED CARE: Performed by: ORTHOPAEDIC SURGERY

## 2024-03-29 PROCEDURE — 36415 COLL VENOUS BLD VENIPUNCTURE: CPT

## 2024-03-29 PROCEDURE — 2580000003 HC RX 258: Performed by: ORTHOPAEDIC SURGERY

## 2024-03-29 PROCEDURE — 3700000001 HC ADD 15 MINUTES (ANESTHESIA): Performed by: ORTHOPAEDIC SURGERY

## 2024-03-29 PROCEDURE — 85014 HEMATOCRIT: CPT

## 2024-03-29 PROCEDURE — 7100000011 HC PHASE II RECOVERY - ADDTL 15 MIN: Performed by: ORTHOPAEDIC SURGERY

## 2024-03-29 PROCEDURE — 99153 MOD SED SAME PHYS/QHP EA: CPT | Performed by: ORTHOPAEDIC SURGERY

## 2024-03-29 PROCEDURE — 7100000000 HC PACU RECOVERY - FIRST 15 MIN: Performed by: ORTHOPAEDIC SURGERY

## 2024-03-29 PROCEDURE — 88304 TISSUE EXAM BY PATHOLOGIST: CPT

## 2024-03-29 RX ORDER — FENTANYL CITRATE 50 UG/ML
25 INJECTION, SOLUTION INTRAMUSCULAR; INTRAVENOUS EVERY 5 MIN PRN
Status: DISCONTINUED | OUTPATIENT
Start: 2024-03-29 | End: 2024-03-29 | Stop reason: HOSPADM

## 2024-03-29 RX ORDER — SODIUM CHLORIDE 0.9 % (FLUSH) 0.9 %
5-40 SYRINGE (ML) INJECTION PRN
Status: DISCONTINUED | OUTPATIENT
Start: 2024-03-29 | End: 2024-03-29 | Stop reason: HOSPADM

## 2024-03-29 RX ORDER — FENTANYL CITRATE 50 UG/ML
INJECTION, SOLUTION INTRAMUSCULAR; INTRAVENOUS PRN
Status: DISCONTINUED | OUTPATIENT
Start: 2024-03-29 | End: 2024-03-29 | Stop reason: SDUPTHER

## 2024-03-29 RX ORDER — MIDAZOLAM HYDROCHLORIDE 1 MG/ML
INJECTION INTRAMUSCULAR; INTRAVENOUS PRN
Status: DISCONTINUED | OUTPATIENT
Start: 2024-03-29 | End: 2024-03-29 | Stop reason: SDUPTHER

## 2024-03-29 RX ORDER — HYDROMORPHONE HYDROCHLORIDE 1 MG/ML
0.25 INJECTION, SOLUTION INTRAMUSCULAR; INTRAVENOUS; SUBCUTANEOUS EVERY 5 MIN PRN
Status: DISCONTINUED | OUTPATIENT
Start: 2024-03-29 | End: 2024-03-29 | Stop reason: HOSPADM

## 2024-03-29 RX ORDER — BUPIVACAINE HYDROCHLORIDE 2.5 MG/ML
INJECTION, SOLUTION EPIDURAL; INFILTRATION; INTRACAUDAL PRN
Status: DISCONTINUED | OUTPATIENT
Start: 2024-03-29 | End: 2024-03-29 | Stop reason: HOSPADM

## 2024-03-29 RX ORDER — LABETALOL HYDROCHLORIDE 5 MG/ML
10 INJECTION, SOLUTION INTRAVENOUS
Status: DISCONTINUED | OUTPATIENT
Start: 2024-03-29 | End: 2024-03-29 | Stop reason: HOSPADM

## 2024-03-29 RX ORDER — SODIUM CHLORIDE 9 MG/ML
INJECTION, SOLUTION INTRAVENOUS PRN
Status: DISCONTINUED | OUTPATIENT
Start: 2024-03-29 | End: 2024-03-29 | Stop reason: HOSPADM

## 2024-03-29 RX ORDER — ONDANSETRON 2 MG/ML
INJECTION INTRAMUSCULAR; INTRAVENOUS PRN
Status: DISCONTINUED | OUTPATIENT
Start: 2024-03-29 | End: 2024-03-29 | Stop reason: SDUPTHER

## 2024-03-29 RX ORDER — NALOXONE HYDROCHLORIDE 0.4 MG/ML
INJECTION, SOLUTION INTRAMUSCULAR; INTRAVENOUS; SUBCUTANEOUS PRN
Status: DISCONTINUED | OUTPATIENT
Start: 2024-03-29 | End: 2024-03-29 | Stop reason: HOSPADM

## 2024-03-29 RX ORDER — SODIUM CHLORIDE 0.9 % (FLUSH) 0.9 %
5-40 SYRINGE (ML) INJECTION EVERY 12 HOURS SCHEDULED
Status: DISCONTINUED | OUTPATIENT
Start: 2024-03-29 | End: 2024-03-29 | Stop reason: HOSPADM

## 2024-03-29 RX ORDER — OXYCODONE HYDROCHLORIDE 5 MG/1
10 TABLET ORAL PRN
Status: DISCONTINUED | OUTPATIENT
Start: 2024-03-29 | End: 2024-03-29 | Stop reason: HOSPADM

## 2024-03-29 RX ORDER — OXYCODONE HYDROCHLORIDE 5 MG/1
5 TABLET ORAL PRN
Status: DISCONTINUED | OUTPATIENT
Start: 2024-03-29 | End: 2024-03-29 | Stop reason: HOSPADM

## 2024-03-29 RX ORDER — DEXTROSE MONOHYDRATE 100 MG/ML
INJECTION, SOLUTION INTRAVENOUS CONTINUOUS PRN
Status: DISCONTINUED | OUTPATIENT
Start: 2024-03-29 | End: 2024-03-29 | Stop reason: HOSPADM

## 2024-03-29 RX ORDER — ONDANSETRON 2 MG/ML
4 INJECTION INTRAMUSCULAR; INTRAVENOUS
Status: DISCONTINUED | OUTPATIENT
Start: 2024-03-29 | End: 2024-03-29 | Stop reason: HOSPADM

## 2024-03-29 RX ORDER — PHENYLEPHRINE HCL IN 0.9% NACL 0.4MG/10ML
SYRINGE (ML) INTRAVENOUS PRN
Status: DISCONTINUED | OUTPATIENT
Start: 2024-03-29 | End: 2024-03-29 | Stop reason: SDUPTHER

## 2024-03-29 RX ORDER — GLUCAGON 1 MG/ML
1 KIT INJECTION PRN
Status: DISCONTINUED | OUTPATIENT
Start: 2024-03-29 | End: 2024-03-29 | Stop reason: HOSPADM

## 2024-03-29 RX ORDER — IPRATROPIUM BROMIDE AND ALBUTEROL SULFATE 2.5; .5 MG/3ML; MG/3ML
1 SOLUTION RESPIRATORY (INHALATION)
Status: DISCONTINUED | OUTPATIENT
Start: 2024-03-29 | End: 2024-03-29 | Stop reason: HOSPADM

## 2024-03-29 RX ORDER — DIPHENHYDRAMINE HYDROCHLORIDE 50 MG/ML
12.5 INJECTION INTRAMUSCULAR; INTRAVENOUS
Status: DISCONTINUED | OUTPATIENT
Start: 2024-03-29 | End: 2024-03-29 | Stop reason: HOSPADM

## 2024-03-29 RX ORDER — SODIUM CHLORIDE, SODIUM LACTATE, POTASSIUM CHLORIDE, CALCIUM CHLORIDE 600; 310; 30; 20 MG/100ML; MG/100ML; MG/100ML; MG/100ML
INJECTION, SOLUTION INTRAVENOUS CONTINUOUS
Status: DISCONTINUED | OUTPATIENT
Start: 2024-03-29 | End: 2024-03-29 | Stop reason: HOSPADM

## 2024-03-29 RX ORDER — PROPOFOL 10 MG/ML
INJECTION, EMULSION INTRAVENOUS CONTINUOUS PRN
Status: DISCONTINUED | OUTPATIENT
Start: 2024-03-29 | End: 2024-03-29 | Stop reason: SDUPTHER

## 2024-03-29 RX ORDER — LIDOCAINE 4 G/G
1 PATCH TOPICAL AS NEEDED
Status: DISCONTINUED | OUTPATIENT
Start: 2024-03-29 | End: 2024-03-29 | Stop reason: HOSPADM

## 2024-03-29 RX ORDER — HYDRALAZINE HYDROCHLORIDE 20 MG/ML
10 INJECTION INTRAMUSCULAR; INTRAVENOUS
Status: DISCONTINUED | OUTPATIENT
Start: 2024-03-29 | End: 2024-03-29 | Stop reason: HOSPADM

## 2024-03-29 RX ORDER — SODIUM CHLORIDE, SODIUM LACTATE, POTASSIUM CHLORIDE, CALCIUM CHLORIDE 600; 310; 30; 20 MG/100ML; MG/100ML; MG/100ML; MG/100ML
INJECTION, SOLUTION INTRAVENOUS ONCE
Status: DISCONTINUED | OUTPATIENT
Start: 2024-03-29 | End: 2024-03-29 | Stop reason: HOSPADM

## 2024-03-29 RX ADMIN — SODIUM CHLORIDE, POTASSIUM CHLORIDE, SODIUM LACTATE AND CALCIUM CHLORIDE: 600; 310; 30; 20 INJECTION, SOLUTION INTRAVENOUS at 09:07

## 2024-03-29 RX ADMIN — ONDANSETRON 4 MG: 2 INJECTION INTRAMUSCULAR; INTRAVENOUS at 10:08

## 2024-03-29 RX ADMIN — MIDAZOLAM HYDROCHLORIDE 1 MG: 2 INJECTION, SOLUTION INTRAMUSCULAR; INTRAVENOUS at 09:12

## 2024-03-29 RX ADMIN — Medication 100 MCG: at 09:50

## 2024-03-29 RX ADMIN — PROPOFOL 20 MG: 10 INJECTION, EMULSION INTRAVENOUS at 09:31

## 2024-03-29 RX ADMIN — MIDAZOLAM HYDROCHLORIDE 1 MG: 2 INJECTION, SOLUTION INTRAMUSCULAR; INTRAVENOUS at 09:21

## 2024-03-29 RX ADMIN — FENTANYL CITRATE 50 MCG: 50 INJECTION, SOLUTION INTRAMUSCULAR; INTRAVENOUS at 09:50

## 2024-03-29 RX ADMIN — CEFAZOLIN SODIUM 2000 MG: 1 INJECTION, POWDER, FOR SOLUTION INTRAMUSCULAR; INTRAVENOUS at 09:07

## 2024-03-29 RX ADMIN — FENTANYL CITRATE 25 MCG: 50 INJECTION, SOLUTION INTRAMUSCULAR; INTRAVENOUS at 09:21

## 2024-03-29 RX ADMIN — FENTANYL CITRATE 25 MCG: 50 INJECTION, SOLUTION INTRAMUSCULAR; INTRAVENOUS at 09:40

## 2024-03-29 RX ADMIN — PROPOFOL 20 MG: 10 INJECTION, EMULSION INTRAVENOUS at 09:38

## 2024-03-29 RX ADMIN — Medication 100 MCG: at 09:43

## 2024-03-29 RX ADMIN — PROPOFOL 30 MG: 10 INJECTION, EMULSION INTRAVENOUS at 09:21

## 2024-03-29 RX ADMIN — PROPOFOL 50 MCG/KG/MIN: 10 INJECTION, EMULSION INTRAVENOUS at 09:21

## 2024-03-29 ASSESSMENT — PAIN - FUNCTIONAL ASSESSMENT
PAIN_FUNCTIONAL_ASSESSMENT: 0-10

## 2024-03-29 NOTE — ANESTHESIA POSTPROCEDURE EVALUATION
Department of Anesthesiology  Postprocedure Note    Patient: Jaqui Ricardo  MRN: [id number]  YOB: 1950  Date of evaluation: 3/29/2024    Procedure Summary       Date: 03/29/24 Room / Location: [facility] MAIN OR 07 / [facility] MAIN OR    Anesthesia Start: 0907 Anesthesia Stop: 1023    Procedure: EXCISION OF GANGLION LEFT WRIST ( ANES IV/SED /LOCAL ) (Left) Diagnosis:       Ganglion of left wrist      (Ganglion of left wrist [M67.432])    Surgeons: Kiran Ba MD Responsible Provider: Negro Conley Jr., MD    Anesthesia Type: MAC, TIVA ASA Status: 2            Anesthesia Type: No value filed.    Sobeida Phase I: Sobeida Score: 10    Sobeida Phase II:      Anesthesia Post Evaluation    Patient location during evaluation: bedside  Patient participation: complete - patient participated  Level of consciousness: awake  Pain score: 0  Airway patency: patent  Nausea & Vomiting: no vomiting and no nausea  Cardiovascular status: blood pressure returned to baseline  Respiratory status: acceptable  Hydration status: stable  Pain management: adequate    No notable events documented.

## 2024-03-29 NOTE — DISCHARGE INSTRUCTIONS
She has a splint which should be kept until she comes to the office    Keep her dressing dry and do not remove it until the office visit    She will take plain Tylenol 650 mg p.o. every 6 hourly for pain    Active range of motion of the finger on the left side is advised    Use of arm sling is advised    Follow-up April 5

## 2024-03-29 NOTE — PROGRESS NOTES
DRESSING LEFT ARM DRY AND INTACT ,ARM SLING IN PLACE , ICE PACK IN PLACE , IV DC'D SITE INTACT NO SWELLING NOTED , DISCHARGE INSTRUCTIONS GIVEN TO PT AND PT'S  PC , BOTH VERBALIZED UNDERSTANDING , NO DISTRESS NOTED

## 2024-03-29 NOTE — OP NOTE
First-Degree Burn  A burn occurs when skin is exposed to too much heat, sun, or harsh chemicals. A first-degree burn (superficial burn) causes mainly redness. It heals in a few days.  Home care  Follow these guidelines when caring for yourself at home:  · Use pain medicine as directed. You may use acetaminophen or ibuprofen to control pain if no pain medicine was prescribed. If you have chronic liver or kidney disease, talk with your health care provider before using these medicines. Also talk with your provider if you've had a stomach ulcer or GI bleeding.    · On the first day, you may put a small towel soaked in cool water (cool compress) on the burn to relieve pain.  · Numbing medicines for sunburn can be used for temporary relief of the burning feeling. These medicines include lidocaine or benzocaine. You don’t need a prescription for them.  · Don't pick or scratch at the affected areas. Use over-the-counter medicines like diphenhydramine for itching. This medicine is taken by mouth.  · Since you don't have an open wound, you don't need to use antibiotic cream or ointment. Sometimes an infection may occur even with proper treatment. Be sure to check the burn daily for the signs of infection listed below.  · Wear a hat, sunscreen, and long sleeves while in the sun.  · Wear loose-fitting clothing until the burn heals.  Follow-up care  Follow up with your health care provider, or as advised. Most first-degree burns heal well without complications.  When to seek medical advice  Call your health care provider right away if any of these signs of infection occur:  · Fever over 100.4°F (38°C)  · Pain gets worse  · Redness or swelling gets worse  · Pus comes from the burn  · Red streaks in your skin come from the burn  · Wound doesn't appear to be healing     © 0989-2797 [employer]. [address] [place] [address], [place] [address]. All rights reserved. This information is not intended as a substitute for  Operative Note      Patient: Jaqui Ricardo  YOB: 1950  MRN: [id number]    Date of Procedure: 3/29/2024    Pre-Op Diagnosis Codes:     * Ganglion of left wrist [M67.432]    Post-Op Diagnosis: Synovial cyst left wrist       Excision of synovial cyst left wrist    Surgeon(s):  Kiran Ba MD    Assistant:   Surgical Assistant: Sy Valera    Anesthesia: IV Sedation    Estimated Blood Loss (mL): Minimal    Complications: None    Specimens:   ID Type Source Tests Collected by Time Destination   1 : Left Hand Ganglion Cyst Tissue Hand SURGICAL PATHOLOGY Kiran Ba MD 3/29/2024 0946        Implants:  * No implants in log *      Drains: * No LDAs found *    Findings: Synovial cyst on the extensor aspect of the wrist involving all extensor tendon eroding some of the tendon with a small ganglionic wall which also was excised in toto and sent for histopathological examination        Detailed Description of Procedure:   After satisfactory administration of IV sedation patient's left upper extremity was thoroughly scrubbed and prepped with chlorhexidine solution isolate the sterile drapes    Timeout was called in  Tourniquet pressure then was raised to 250 mmHg  Incision line was marked and it was injected with 5 cc of 0.25% Marcaine with epinephrine    Skin incision was made after dividing skin and subcutaneous tissue it was noticed that the cystic lesion was involving all the common extensor tendons wrapping around on the common extensor tendon and going up to the wrist joint capsule    Painstaking dissection with proper hemostasis and excision of this synovial cystic lesion was carried out without injuring any tendon once the whole mass was removed it was sent for histopathological examination    Proper hemostasis was obtained  Irrigation of the wound was performed  3-0 Vicryl was used for subcutaneous tissue closure and 3-0 Monocryl for skin closure with Dermabond  Patient tolerated  professional medical care. Always follow your healthcare professional's instructions.

## 2024-03-29 NOTE — ANESTHESIA PRE PROCEDURE
Counseling given: Not Answered      Vital Signs (Current):   Vitals:    03/29/24 0739   BP: 128/78   Pulse: 60   Resp: 18   Temp: 97.6 °F (36.4 °C)   SpO2: 99%                                              BP Readings from Last 3 Encounters:   03/29/24 128/78   03/27/24 122/80   03/19/24 138/80       NPO Status: Time of last liquid consumption: 2330                        Time of last solid consumption: 2330                        Date of last liquid consumption: 03/28/24                        Date of last solid food consumption: 03/28/24    BMI:   Wt Readings from Last 3 Encounters:   03/27/24 66.7 kg (147 lb)   01/31/24 67.1 kg (148 lb)   11/15/23 66.6 kg (146 lb 12.8 oz)     There is no height or weight on file to calculate BMI.    CBC:   Lab Results   Component Value Date/Time    WBC 5.5 08/07/2019 04:04 PM    RBC 4.11 08/07/2019 04:04 PM    HGB 13.9 03/29/2024 08:00 AM    HCT 41.3 03/29/2024 08:00 AM    MCV 95 08/07/2019 04:04 PM    RDW 13.3 08/07/2019 04:04 PM     08/07/2019 04:04 PM       CMP:   Lab Results   Component Value Date/Time     04/20/2021 12:00 AM    K 4.6 04/20/2021 12:00 AM     04/20/2021 12:00 AM    CO2 23 04/20/2021 12:00 AM    BUN 10 04/20/2021 12:00 AM    CREATININE 0.73 04/20/2021 12:00 AM    GFRAA 98 04/20/2021 12:00 AM    AGRATIO 1.8 04/20/2021 12:00 AM    GLUCOSE 96 04/20/2021 12:00 AM    PROT 6.5 04/20/2021 12:00 AM    CALCIUM 9.1 04/20/2021 12:00 AM    BILITOT 0.6 04/20/2021 12:00 AM    ALKPHOS 83 04/20/2021 12:00 AM    AST 26 04/20/2021 12:00 AM    ALT 22 04/20/2021 12:00 AM       POC Tests: No results for input(s): \"POCGLU\", \"POCNA\", \"POCK\", \"POCCL\", \"POCBUN\", \"POCHEMO\", \"POCHCT\" in the last 72 hours.    Coags: No results found for: \"PROTIME\", \"INR\", \"APTT\"    HCG (If Applicable): No results found for: \"PREGTESTUR\", \"PREGSERUM\", \"HCG\", \"HCGQUANT\"     ABGs: No results found for: \"PHART\", \"PO2ART\", \"YPL9CRD\", \"KXO6AAC\", \"BEART\", \"Z8GFPMFT\"

## 2024-04-05 ENCOUNTER — OFFICE VISIT (OUTPATIENT)
Facility: CLINIC | Age: 74
End: 2024-04-05
Payer: COMMERCIAL

## 2024-04-05 VITALS
TEMPERATURE: 97.7 F | BODY MASS INDEX: 27.56 KG/M2 | OXYGEN SATURATION: 99 % | HEART RATE: 57 BPM | RESPIRATION RATE: 18 BRPM | SYSTOLIC BLOOD PRESSURE: 124 MMHG | WEIGHT: 146 LBS | HEIGHT: 61 IN | DIASTOLIC BLOOD PRESSURE: 83 MMHG

## 2024-04-05 DIAGNOSIS — E89.0 POSTOPERATIVE HYPOTHYROIDISM: ICD-10-CM

## 2024-04-05 DIAGNOSIS — Z85.850 HISTORY OF MALIGNANT NEOPLASM OF THYROID: ICD-10-CM

## 2024-04-05 DIAGNOSIS — Z11.59 NEED FOR HEPATITIS C SCREENING TEST: ICD-10-CM

## 2024-04-05 DIAGNOSIS — E53.8 VITAMIN B12 DEFICIENCY (NON ANEMIC): ICD-10-CM

## 2024-04-05 DIAGNOSIS — R73.03 PREDIABETES: ICD-10-CM

## 2024-04-05 DIAGNOSIS — E55.9 VITAMIN D DEFICIENCY: ICD-10-CM

## 2024-04-05 DIAGNOSIS — E78.00 PURE HYPERCHOLESTEROLEMIA: ICD-10-CM

## 2024-04-05 DIAGNOSIS — E89.0 POSTOPERATIVE HYPOTHYROIDISM: Primary | ICD-10-CM

## 2024-04-05 PROCEDURE — 99204 OFFICE O/P NEW MOD 45 MIN: CPT | Performed by: INTERNAL MEDICINE

## 2024-04-05 PROCEDURE — 1123F ACP DISCUSS/DSCN MKR DOCD: CPT | Performed by: INTERNAL MEDICINE

## 2024-04-05 RX ORDER — FLUTICASONE PROPIONATE 50 MCG
2 SPRAY, SUSPENSION (ML) NASAL DAILY
Qty: 48 G | Refills: 1 | Status: SHIPPED | OUTPATIENT
Start: 2024-04-05

## 2024-04-05 RX ORDER — VITAMIN B COMPLEX
1000 TABLET ORAL DAILY
COMMUNITY

## 2024-04-05 SDOH — ECONOMIC STABILITY: FOOD INSECURITY: WITHIN THE PAST 12 MONTHS, YOU WORRIED THAT YOUR FOOD WOULD RUN OUT BEFORE YOU GOT MONEY TO BUY MORE.: NEVER TRUE

## 2024-04-05 SDOH — ECONOMIC STABILITY: HOUSING INSECURITY
IN THE LAST 12 MONTHS, WAS THERE A TIME WHEN YOU DID NOT HAVE A STEADY PLACE TO SLEEP OR SLEPT IN A SHELTER (INCLUDING NOW)?: NO

## 2024-04-05 SDOH — ECONOMIC STABILITY: INCOME INSECURITY: HOW HARD IS IT FOR YOU TO PAY FOR THE VERY BASICS LIKE FOOD, HOUSING, MEDICAL CARE, AND HEATING?: NOT HARD AT ALL

## 2024-04-05 SDOH — ECONOMIC STABILITY: FOOD INSECURITY: WITHIN THE PAST 12 MONTHS, THE FOOD YOU BOUGHT JUST DIDN'T LAST AND YOU DIDN'T HAVE MONEY TO GET MORE.: NEVER TRUE

## 2024-04-05 ASSESSMENT — ENCOUNTER SYMPTOMS
RESPIRATORY NEGATIVE: 1
ALLERGIC/IMMUNOLOGIC NEGATIVE: 1
GASTROINTESTINAL NEGATIVE: 1
EYES NEGATIVE: 1

## 2024-04-05 ASSESSMENT — PATIENT HEALTH QUESTIONNAIRE - PHQ9
SUM OF ALL RESPONSES TO PHQ9 QUESTIONS 1 & 2: 0
3. TROUBLE FALLING OR STAYING ASLEEP: NOT AT ALL
7. TROUBLE CONCENTRATING ON THINGS, SUCH AS READING THE NEWSPAPER OR WATCHING TELEVISION: NOT AT ALL
1. LITTLE INTEREST OR PLEASURE IN DOING THINGS: NOT AT ALL
SUM OF ALL RESPONSES TO PHQ QUESTIONS 1-9: 0
9. THOUGHTS THAT YOU WOULD BE BETTER OFF DEAD, OR OF HURTING YOURSELF: NOT AT ALL
4. FEELING TIRED OR HAVING LITTLE ENERGY: NOT AT ALL
2. FEELING DOWN, DEPRESSED OR HOPELESS: NOT AT ALL
SUM OF ALL RESPONSES TO PHQ QUESTIONS 1-9: 0
5. POOR APPETITE OR OVEREATING: NOT AT ALL
SUM OF ALL RESPONSES TO PHQ QUESTIONS 1-9: 0
6. FEELING BAD ABOUT YOURSELF - OR THAT YOU ARE A FAILURE OR HAVE LET YOURSELF OR YOUR FAMILY DOWN: NOT AT ALL
SUM OF ALL RESPONSES TO PHQ QUESTIONS 1-9: 0
8. MOVING OR SPEAKING SO SLOWLY THAT OTHER PEOPLE COULD HAVE NOTICED. OR THE OPPOSITE, BEING SO FIGETY OR RESTLESS THAT YOU HAVE BEEN MOVING AROUND A LOT MORE THAN USUAL: NOT AT ALL
10. IF YOU CHECKED OFF ANY PROBLEMS, HOW DIFFICULT HAVE THESE PROBLEMS MADE IT FOR YOU TO DO YOUR WORK, TAKE CARE OF THINGS AT HOME, OR GET ALONG WITH OTHER PEOPLE: NOT DIFFICULT AT ALL

## 2024-04-05 NOTE — PROGRESS NOTES
\"Have you been to the ER, urgent care clinic since your last visit?  Hospitalized since your last visit?\"    NO    “Have you seen or consulted any other health care providers outside of [facility] since your last visit?”    NO        “Have you had a colorectal cancer screening such as a colonoscopy/FIT/Cologuard?    YES - Type: Colonoscopy - Where: Dr. Dewey in 2020 Nurse/CMA to request most recent records if not in the chart     No colonoscopy on file  No cologuard on file  No FIT/FOBT on file   No flexible sigmoidoscopy on file         Click Here for Release of Records Request  -- Mary Ghotra LPN   
deficiency labs ordered    Prediabetes hemoglobin A1c ordered.  Postnasal drainage causing frequent clearing of the throat's.  She was told to use saline washes.  She can use fluticasone nasal spray and loratadine as needed.    I have reviewed the charts and notes from ENT specialist and endocrinologist.    She has taken all the vaccines except the last COVID-vaccine.    Fasting labs ordered.        Return in about 1 year (around 4/5/2025) for Medicare Wellness visit.         SUBJECTIVE/OBJECTIVE:  STEPHANIE Ricardo with a very pleasant personality came to establish with me.  She wants to do annual blood work also.  She has hypothyroidism and takes levothyroxine for supplements.  She had papillary thyroid cancer and she underwent surgery and thyroidectomy.  It was stage IV.  She also is seeing ENT specialist and endocrinologist.  Recently she had a surgery for ganglion cyst on her left wrist.  She has no pain in her hands.  She is not taking any pain medicines.  Sometimes she has postnasal drainage and she has to clear the throat so started on fluticasone nasal spray and loratadine.  She had along with near total thyroidectomy with left-sided radical neck dissection.  Left recurrent laryngeal nerve was involved.  She had finished radioactive iodine and TG she finished Thyrogen method.  She has been up to date with her bone density showing osteopenia.  She does take vitamin D3 and calcium.  She takes atorvastatin and  she has family history of heart disease with her mother and coronary artery disease.  she also prediabetic.  She is up to date for mammogram and she had colonoscopy done with , we do not have records she is very active go to the gym every day and almost for 2 to 3 hours a day and doing lots of swimming.    Allergies   Allergen Reactions    Latex     Latex Rash     Current Outpatient Medications   Medication Sig Dispense Refill    Vitamin D (CHOLECALCIFEROL) 25 MCG (1000 UT) TABS tablet

## 2024-04-06 LAB
25(OH)D3+25(OH)D2 SERPL-MCNC: 47.9 NG/ML (ref 30–100)
ALBUMIN SERPL-MCNC: 4.3 G/DL (ref 3.8–4.8)
ALBUMIN/GLOB SERPL: 1.7 {RATIO} (ref 1.2–2.2)
ALP SERPL-CCNC: 102 IU/L (ref 44–121)
ALT SERPL-CCNC: 19 IU/L (ref 0–32)
AST SERPL-CCNC: 24 IU/L (ref 0–40)
BASOPHILS # BLD AUTO: 0 X10E3/UL (ref 0–0.2)
BASOPHILS NFR BLD AUTO: 1 %
BILIRUB SERPL-MCNC: 0.6 MG/DL (ref 0–1.2)
BUN SERPL-MCNC: 11 MG/DL (ref 8–27)
BUN/CREAT SERPL: 16 (ref 12–28)
CALCIUM SERPL-MCNC: 9.3 MG/DL (ref 8.7–10.3)
CHLORIDE SERPL-SCNC: 100 MMOL/L (ref 96–106)
CHOLEST SERPL-MCNC: 167 MG/DL (ref 100–199)
CO2 SERPL-SCNC: 22 MMOL/L (ref 20–29)
CREAT SERPL-MCNC: 0.7 MG/DL (ref 0.57–1)
EGFRCR SERPLBLD CKD-EPI 2021: 92 ML/MIN/1.73
EOSINOPHIL # BLD AUTO: 0.1 X10E3/UL (ref 0–0.4)
EOSINOPHIL NFR BLD AUTO: 1 %
ERYTHROCYTE [DISTWIDTH] IN BLOOD BY AUTOMATED COUNT: 12.1 % (ref 11.7–15.4)
GLOBULIN SER CALC-MCNC: 2.6 G/DL (ref 1.5–4.5)
GLUCOSE SERPL-MCNC: 100 MG/DL (ref 70–99)
HBA1C MFR BLD: 6.1 % (ref 4.8–5.6)
HCT VFR BLD AUTO: 43.2 % (ref 34–46.6)
HCV IGG SERPL QL IA: NON REACTIVE
HDLC SERPL-MCNC: 67 MG/DL
HGB BLD-MCNC: 14.3 G/DL (ref 11.1–15.9)
IMM GRANULOCYTES # BLD AUTO: 0 X10E3/UL (ref 0–0.1)
IMM GRANULOCYTES NFR BLD AUTO: 0 %
LDLC SERPL CALC-MCNC: 84 MG/DL (ref 0–99)
LYMPHOCYTES # BLD AUTO: 1.8 X10E3/UL (ref 0.7–3.1)
LYMPHOCYTES NFR BLD AUTO: 27 %
MCH RBC QN AUTO: 31 PG (ref 26.6–33)
MCHC RBC AUTO-ENTMCNC: 33.1 G/DL (ref 31.5–35.7)
MCV RBC AUTO: 94 FL (ref 79–97)
MONOCYTES # BLD AUTO: 0.5 X10E3/UL (ref 0.1–0.9)
MONOCYTES NFR BLD AUTO: 7 %
NEUTROPHILS # BLD AUTO: 4.2 X10E3/UL (ref 1.4–7)
NEUTROPHILS NFR BLD AUTO: 64 %
PLATELET # BLD AUTO: 331 X10E3/UL (ref 150–450)
POTASSIUM SERPL-SCNC: 4.8 MMOL/L (ref 3.5–5.2)
PROT SERPL-MCNC: 6.9 G/DL (ref 6–8.5)
RBC # BLD AUTO: 4.61 X10E6/UL (ref 3.77–5.28)
SODIUM SERPL-SCNC: 138 MMOL/L (ref 134–144)
TRIGL SERPL-MCNC: 90 MG/DL (ref 0–149)
VIT B12 SERPL-MCNC: 492 PG/ML (ref 232–1245)
VLDLC SERPL CALC-MCNC: 16 MG/DL (ref 5–40)
WBC # BLD AUTO: 6.6 X10E3/UL (ref 3.4–10.8)

## 2024-05-31 ENCOUNTER — NURSE ONLY (OUTPATIENT)
Age: 74
End: 2024-05-31

## 2024-05-31 DIAGNOSIS — C73 THYROID CANCER (HCC): ICD-10-CM

## 2024-05-31 DIAGNOSIS — E89.0 POSTPROCEDURAL HYPOTHYROIDISM: ICD-10-CM

## 2024-06-01 LAB
T4 FREE SERPL-MCNC: 1.6 NG/DL (ref 0.82–1.77)
TSH SERPL DL<=0.005 MIU/L-ACNC: 0.36 UIU/ML (ref 0.45–4.5)

## 2024-06-10 ENCOUNTER — OFFICE VISIT (OUTPATIENT)
Age: 74
End: 2024-06-10
Payer: COMMERCIAL

## 2024-06-10 VITALS
OXYGEN SATURATION: 96 % | BODY MASS INDEX: 27.53 KG/M2 | HEART RATE: 74 BPM | DIASTOLIC BLOOD PRESSURE: 77 MMHG | SYSTOLIC BLOOD PRESSURE: 118 MMHG | WEIGHT: 145.8 LBS | HEIGHT: 61 IN | TEMPERATURE: 97.9 F

## 2024-06-10 DIAGNOSIS — C73 THYROID CANCER (HCC): ICD-10-CM

## 2024-06-10 DIAGNOSIS — E89.0 POSTPROCEDURAL HYPOTHYROIDISM: Primary | ICD-10-CM

## 2024-06-10 PROCEDURE — 1123F ACP DISCUSS/DSCN MKR DOCD: CPT | Performed by: INTERNAL MEDICINE

## 2024-06-10 PROCEDURE — 99214 OFFICE O/P EST MOD 30 MIN: CPT | Performed by: INTERNAL MEDICINE

## 2024-06-10 RX ORDER — PHENTERMINE AND TOPIRAMATE 3.75; 23 MG/1; MG/1
CAPSULE, EXTENDED RELEASE ORAL
Qty: 14 CAPSULE | Refills: 0 | Status: SHIPPED | OUTPATIENT
Start: 2024-06-10 | End: 2024-12-10

## 2024-06-10 RX ORDER — PHENTERMINE AND TOPIRAMATE 7.5; 46 MG/1; MG/1
CAPSULE, EXTENDED RELEASE ORAL
Qty: 30 CAPSULE | Refills: 5 | Status: SHIPPED | OUTPATIENT
Start: 2024-06-10 | End: 2024-12-17

## 2024-06-10 NOTE — PROGRESS NOTES
and as she has busy schedule this year, will do the test  jan - feb 2024 Nov 2023  : usg  from oct 2023  - no recurrence seen   Explained the process again today    Jan 2024 : 5 years into diagnosis -  again we discussed withdrawal of thyroid  versus  thyrogen method   She prefers thyrogen method as she dreads the  hypothyroid state with withdrawal method   June 2024 :  TG was 5.6  ,      she did not have elevation of TG  over 10, so deferred WALKER   Usg neck in oct 2023 - no tissue         2.  HYPOTHYROIDISM :  TSH  To be maintained between 0.1 to 0.5    Continuing on UNITHROID 88 MCG A DAY       3.  BMD  -   Date :Dec 2023   Bone DEXA  ap spine T-score -0.7; left femoral Neck T- score  -0.8, right femoral neck T-score -0.5 radius -2   Date : nov 2021   Bone DEXA  ap spine T-score -0.5; left femoral Neck T- score  0.8, right femoral neck T-score -0.8  Stable BMD,  stay on calcium and vit D      4. Body mass index is 27.55 kg/m².   I did not recommend ozempic  which she was interested ,  but I suggested using QSYMIA        5. Sister has stomach cancer  -  suggesting to get upper endoscopy if possible      Reviewed results with patient and discussed the labs being ordered today/bnv   Patient voiced understanding of plan of care

## 2024-06-10 NOTE — PATIENT INSTRUCTIONS
SPECIFIC INSTRUCTIONS BELOW         Qsymia      You have to call  [phone]  after 3  days of sending the prescription to authorize the shipment and payment                unithroid  88  mcg  A day,   BRAND  Every day ,    on empty stomach with water only, no other meds or food or drinks   For next half hour      Take any kind of vitamins, calcium, iron   Pills  4 hours later           -------------PAY ATTENTION TO THESE GENERAL INSTRUCTIONS -----------------      - The medications prescribed at this visit will not be available at pharmacy until 6 pm       - YOUR MED LIST IS NOT UP TO DATE AS SOME CHANGES ARE BEING MADE AFTER THE VISIT - FOLLOW SPECIFIC INSTRUCTIONS  ABOVE     -ANY tests other than blood work, which you opt to do  outside the  [facility] facilities, you are responsible for prior authorizations if  required    - HEALTH MAINTENANCE IS NOT GOING TO BE UP TO DATE ON YOUR AVS- PLEASE IGNORE     Results     *Normal results will not be notified by a phone call starting January 1 2021   *If you have an upcoming visit, the results will be discussed at the visit   *Please sign up for MY CHART if you want access to your lab and test results  *Abnormal results which require immediate attention will be notified by phone call   *Abnormal results which do not require immediate assistance will be notified in 1-2 weeks       Refills    -    have your pharmacy send us a refill request . Refills are done max for one year and a visit is a must before refills are extended    Follow up appointments -  highly encourage you to make it when you are checking out. We can accommodate you into the schedule based on your clinical situation, but not for extending refills beyond a year. Labs are important to give refills and is important to get labs before the visit     Phone calls  -  Allow  24 hrs. for non-urgent calls to be returned  Prior authorization - It may take 2-4 weeks to process  Forms  -  MARGARET ECHEVARRIA

## 2024-08-23 ENCOUNTER — HOSPITAL ENCOUNTER (OUTPATIENT)
Facility: HOSPITAL | Age: 74
Discharge: HOME | End: 2024-08-23
Attending: INTERNAL MEDICINE
Payer: COMMERCIAL

## 2024-08-23 DIAGNOSIS — C73 THYROID CANCER (HCC): ICD-10-CM

## 2024-08-23 DIAGNOSIS — E89.0 POSTPROCEDURAL HYPOTHYROIDISM: ICD-10-CM

## 2024-08-23 PROCEDURE — 76536 US EXAM OF HEAD AND NECK: CPT

## 2024-08-30 DIAGNOSIS — C73 MALIGNANT NEOPLASM OF THYROID GLAND (HCC): Primary | ICD-10-CM

## 2024-08-30 DIAGNOSIS — R22.1 NECK MASS: ICD-10-CM

## 2024-09-03 ENCOUNTER — OFFICE VISIT (OUTPATIENT)
Age: 74
End: 2024-09-03
Payer: COMMERCIAL

## 2024-09-03 VITALS
SYSTOLIC BLOOD PRESSURE: 138 MMHG | HEART RATE: 76 BPM | WEIGHT: 144 LBS | DIASTOLIC BLOOD PRESSURE: 80 MMHG | BODY MASS INDEX: 27.19 KG/M2 | TEMPERATURE: 97.7 F | HEIGHT: 61 IN | OXYGEN SATURATION: 98 %

## 2024-09-03 DIAGNOSIS — R22.1 NECK MASS: Primary | ICD-10-CM

## 2024-09-03 DIAGNOSIS — R22.1 NECK MASS: ICD-10-CM

## 2024-09-03 DIAGNOSIS — E89.0 POSTPROCEDURAL HYPOTHYROIDISM: ICD-10-CM

## 2024-09-03 DIAGNOSIS — C73 MALIGNANT NEOPLASM OF THYROID GLAND (HCC): Primary | ICD-10-CM

## 2024-09-03 DIAGNOSIS — C73 THYROID CANCER (HCC): ICD-10-CM

## 2024-09-03 PROCEDURE — 1123F ACP DISCUSS/DSCN MKR DOCD: CPT | Performed by: INTERNAL MEDICINE

## 2024-09-03 PROCEDURE — 99214 OFFICE O/P EST MOD 30 MIN: CPT | Performed by: INTERNAL MEDICINE

## 2024-09-03 NOTE — PROGRESS NOTES
[facility] DIABETES AND ENDOCRINOLOGY                Kylie Demarco MD FACE       HISTORY OF PRESENT ILLNESS   Jaqui Ricardo is a 74  y.o.  female.   HPI   Patient is here for follow up  for management of Papillary  Thyroid cancer   And hypothyroidism    From  June 2024      Pt had her follow up usg , surveillance for PTC  It revealed presence of a neck mass  on right supraclavicular area     She is here concerned   Lost 1 lb   Appetite is good   Accompanied by her son at the visit today       June 2024     She had thyrogen stim  testing   done in the interim for Thyroid cancer   She passed it with no rise of TG   She is tolerating the  UNITHROID   Her sister got diagnosed with stomach ulcer - she is concerned about this   She wants to lose some weight          Old history :    Patient in general good state of health has Noticed hoarse voice 4 weeks ago    Got treated with antibiotics for a week, but hoarseness persisted    She saw Dr. Pacheco and he ordered Ct/ usg neck    He found left vocal cord palsy on laryngoscopy    He ordered for FNA of thyroid mass and left supraclavicular LN  Which revealed PTC   Ct scan showed locally invasive metastatic tissues involvement    She underwent near total thyroidectomy with radical dissection of ipsilateral LN and central dissection of LN surgery  On May 8 2019 at [facility]           Review of Systems    Constitutional: Negative.     Hoarse voice on and off    Psychiatric/Behavioral: Negative.           Physical Exam    Constitutional: She is oriented to person, place, and time. She appears well-developed and well-nourished.    Neck: Normal range of motion. Neck supple. (healed surgical scar for thyroidectomy ) present.    Psychiatric: She has a normal mood and affect.         Labs    Lab Results   Component Value Date    TSH 0.362 (L) 05/31/2024    FT3 6.1 (H) 05/21/2019    T4FREE 1.60 05/31/2024            ASSESSMENT and PLAN      Right supraclavicular neck mass -

## 2024-09-03 NOTE — PATIENT INSTRUCTIONS
SPECIFIC INSTRUCTIONS BELOW            unithroid  88  mcg  A day,   BRAND  Every day ,    on empty stomach with water only, no other meds or food or drinks   For next half hour      Take any kind of vitamins, calcium, iron   Pills  4 hours later           -------------PAY ATTENTION TO THESE GENERAL INSTRUCTIONS -----------------      - The medications prescribed at this visit will not be available at pharmacy until 6 pm       - YOUR MED LIST IS NOT UP TO DATE AS SOME CHANGES ARE BEING MADE AFTER THE VISIT - FOLLOW SPECIFIC INSTRUCTIONS  ABOVE     -ANY tests other than blood work, which you opt to do  outside the  [facility] facilities, you are responsible for prior authorizations if  required    - HEALTH MAINTENANCE IS NOT GOING TO BE UP TO DATE ON YOUR AVS- PLEASE IGNORE     Results     *Normal results will not be notified by a phone call starting January 1 2021   *If you have an upcoming visit, the results will be discussed at the visit   *Please sign up for MY CHART if you want access to your lab and test results  *Abnormal results which require immediate attention will be notified by phone call   *Abnormal results which do not require immediate assistance will be notified in 1-2 weeks       Refills    -    have your pharmacy send us a refill request . Refills are done max for one year and a visit is a must before refills are extended    Follow up appointments -  highly encourage you to make it when you are checking out. We can accommodate you into the schedule based on your clinical situation, but not for extending refills beyond a year. Labs are important to give refills and is important to get labs before the visit     Phone calls  -  Allow  24 hrs. for non-urgent calls to be returned  Prior authorization - It may take 2-4 weeks to process  Forms  -  FMLA, DMV etc., will take up to 2 weeks to process  Cancellations - please notify the office 2 days in advance   Samples  - will only be dispensed at

## 2024-09-03 NOTE — PROGRESS NOTES
Jaqui Ricardo is a 74 y.o. female here for   Chief Complaint   Patient presents with    Thyroid Problem       1. Have you been to the ER, urgent care clinic since your last visit?  Hospitalized since your last visit? -No    2. Have you seen or consulted any other health care providers outside of the [facility] since your last visit?  Include any pap smears or colon screening.-No      /80 (Site: Right Upper Arm, Position: Sitting, Cuff Size: Large Adult)   Pulse 76   Temp 97.7 °F (36.5 °C) (Temporal)   Ht 1.549 m (5' 1\")   Wt 65.3 kg (144 lb)   SpO2 98%   BMI 27.21 kg/m²

## 2024-09-04 ENCOUNTER — HOSPITAL ENCOUNTER (OUTPATIENT)
Age: 74
Discharge: HOME | End: 2024-09-07
Payer: COMMERCIAL

## 2024-09-04 DIAGNOSIS — C73 MALIGNANT NEOPLASM OF THYROID GLAND (HCC): ICD-10-CM

## 2024-09-04 DIAGNOSIS — R22.1 NECK MASS: ICD-10-CM

## 2024-09-04 PROCEDURE — 6360000004 HC RX CONTRAST MEDICATION: Performed by: RADIOLOGY

## 2024-09-04 PROCEDURE — 71260 CT THORAX DX C+: CPT

## 2024-09-04 PROCEDURE — 70491 CT SOFT TISSUE NECK W/DYE: CPT

## 2024-09-04 RX ORDER — IOPAMIDOL 755 MG/ML
100 INJECTION, SOLUTION INTRAVASCULAR
Status: COMPLETED | OUTPATIENT
Start: 2024-09-04 | End: 2024-09-04

## 2024-09-04 RX ADMIN — IOPAMIDOL 100 ML: 755 INJECTION, SOLUTION INTRAVENOUS at 11:49

## 2024-09-06 ENCOUNTER — HOSPITAL ENCOUNTER (OUTPATIENT)
Facility: HOSPITAL | Age: 74
Discharge: HOME | End: 2024-09-09
Attending: INTERNAL MEDICINE
Payer: COMMERCIAL

## 2024-09-06 DIAGNOSIS — R22.1 NECK MASS: ICD-10-CM

## 2024-09-06 DIAGNOSIS — C73 MALIGNANT NEOPLASM OF THYROID GLAND (HCC): ICD-10-CM

## 2024-09-06 PROCEDURE — 88173 CYTOPATH EVAL FNA REPORT: CPT

## 2024-09-06 PROCEDURE — 2500000003 HC RX 250 WO HCPCS: Performed by: INTERNAL MEDICINE

## 2024-09-06 PROCEDURE — 88172 CYTP DX EVAL FNA 1ST EA SITE: CPT

## 2024-09-06 PROCEDURE — 88342 IMHCHEM/IMCYTCHM 1ST ANTB: CPT

## 2024-09-06 PROCEDURE — 2709999900 US FINE NEEDLE ASPIRATION

## 2024-09-06 PROCEDURE — 88305 TISSUE EXAM BY PATHOLOGIST: CPT

## 2024-09-06 RX ORDER — LIDOCAINE HYDROCHLORIDE 10 MG/ML
10 INJECTION, SOLUTION EPIDURAL; INFILTRATION; INTRACAUDAL; PERINEURAL ONCE
Status: COMPLETED | OUTPATIENT
Start: 2024-09-06 | End: 2024-09-06

## 2024-09-06 RX ADMIN — LIDOCAINE HYDROCHLORIDE 10 ML: 10 INJECTION, SOLUTION EPIDURAL; INFILTRATION; INTRACAUDAL; PERINEURAL at 13:30

## 2024-09-17 ENCOUNTER — TELEPHONE (OUTPATIENT)
Age: 74
End: 2024-09-17

## 2024-09-26 ENCOUNTER — TELEPHONE (OUTPATIENT)
Age: 74
End: 2024-09-26

## 2024-09-26 DIAGNOSIS — C73 THYROID CANCER (HCC): Primary | ICD-10-CM

## 2024-10-01 ENCOUNTER — TELEPHONE (OUTPATIENT)
Age: 74
End: 2024-10-01

## 2024-10-01 NOTE — TELEPHONE ENCOUNTER
[facility] comprehensive test block has no more tumor specimen no other material to use. Stating would need another biopsy     [phone] option 3  ref# [id number]

## 2024-10-03 ENCOUNTER — TELEPHONE (OUTPATIENT)
Age: 74
End: 2024-10-03

## 2024-10-03 DIAGNOSIS — C73 THYROID CANCER (HCC): Primary | ICD-10-CM

## 2024-10-03 RX ORDER — ALPRAZOLAM 0.25 MG
TABLET ORAL
Qty: 2 TABLET | Refills: 0 | Status: SHIPPED | OUTPATIENT
Start: 2024-10-03 | End: 2024-10-30

## 2024-10-03 NOTE — TELEPHONE ENCOUNTER
We need to inquire if they can do the testing  on the  surgical specimen   from    May 10 2019   ( total thyroidectomy  at   [facility]  )     The same ilsa- genomics panel  -     No  rush  on this   though        Kylie Demarco MD'

## 2024-10-03 NOTE — TELEPHONE ENCOUNTER
She will be seeing a surgeon to get mass removed, then  we can send the sample  to them     Right now, wait     Kylie Demarco MD

## 2024-10-07 RX ORDER — FLUTICASONE PROPIONATE 50 MCG
2 SPRAY, SUSPENSION (ML) NASAL DAILY
Qty: 3 EACH | Refills: 1 | Status: SHIPPED | OUTPATIENT
Start: 2024-10-07

## 2024-10-15 NOTE — TELEPHONE ENCOUNTER
A message is sent via fax  that the sample is insufficient to process   from supraclavicular fossa         The  [facility]gen[facility]   check on old  thyroidectomy sample  question is pending       Kylie Demarco MD

## 2024-10-18 ENCOUNTER — HOSPITAL ENCOUNTER (OUTPATIENT)
Facility: HOSPITAL | Age: 74
Discharge: HOME | End: 2024-10-21
Attending: INTERNAL MEDICINE
Payer: COMMERCIAL

## 2024-10-18 DIAGNOSIS — C73 THYROID CANCER (HCC): ICD-10-CM

## 2024-10-18 LAB
GLUCOSE BLD STRIP.AUTO-MCNC: 90 MG/DL (ref 65–117)
SERVICE CMNT-IMP: NORMAL

## 2024-10-18 PROCEDURE — A9609 HC RX DIAGNOSTIC RADIOPHARMACEUTICAL: HCPCS | Performed by: INTERNAL MEDICINE

## 2024-10-18 PROCEDURE — 82962 GLUCOSE BLOOD TEST: CPT

## 2024-10-18 PROCEDURE — 78815 PET IMAGE W/CT SKULL-THIGH: CPT

## 2024-10-18 PROCEDURE — 3430000000 HC RX DIAGNOSTIC RADIOPHARMACEUTICAL: Performed by: INTERNAL MEDICINE

## 2024-10-18 RX ORDER — FLUDEOXYGLUCOSE F-18 500 MCI/ML
10 INJECTION INTRAVENOUS
Status: COMPLETED | OUTPATIENT
Start: 2024-10-18 | End: 2024-10-18

## 2024-10-18 RX ADMIN — FLUDEOXYGLUCOSE F-18 10 MILLICURIE: 500 INJECTION INTRAVENOUS at 11:50

## 2024-10-23 ENCOUNTER — TELEPHONE (OUTPATIENT)
Age: 74
End: 2024-10-23

## 2024-10-23 NOTE — TELEPHONE ENCOUNTER
Accession  #  is   [id number]    May 8 2019   @ 2.49 pm    at   [facility]       Kylie Demarco MD

## 2024-10-23 NOTE — TELEPHONE ENCOUNTER
Call came in from a lab stating specimen ID does not match what was selected for this patient. Please call [phone] to clarify, ref# [id number].   Thank you

## 2024-10-24 NOTE — TELEPHONE ENCOUNTER
Spoke with [facility] and advised them to look at the date on latest pathology report.  They were trying to match 2 different dates of service and procedures.  Request will be sent to 3rd party so they can check with [facility] to see if tissue is available.  If available they will reach back out and we will need to do an updated form.

## 2024-10-28 NOTE — TELEPHONE ENCOUNTER
[facility] left message that there is a delay with her testing as they have a large back log with this particular department and may take some time to be sent out. If further questions can call [phone] opt 3 ref#[id number]

## 2024-11-04 NOTE — TELEPHONE ENCOUNTER
[facility] called still having issues receiving specimen  so will be a further delay to process

## 2024-11-13 ENCOUNTER — PROCEDURE VISIT (OUTPATIENT)
Age: 74
End: 2024-11-13

## 2024-11-13 VITALS
HEIGHT: 61 IN | OXYGEN SATURATION: 100 % | BODY MASS INDEX: 28.07 KG/M2 | DIASTOLIC BLOOD PRESSURE: 89 MMHG | HEART RATE: 65 BPM | WEIGHT: 148.7 LBS | SYSTOLIC BLOOD PRESSURE: 147 MMHG

## 2024-11-13 DIAGNOSIS — E89.0 POSTPROCEDURAL HYPOTHYROIDISM: ICD-10-CM

## 2024-11-13 DIAGNOSIS — C73 THYROID CANCER (HCC): ICD-10-CM

## 2024-11-13 NOTE — PROGRESS NOTES
Patient identified by name and date of birth  Jaqui Ricardo is a 74 y.o. female   Chief Complaint   Patient presents with    Follow-up     Ultrasound      Vitals:    11/13/24 1601 11/13/24 1606   BP: (!) 146/89 (!) 147/89   Site: Right Upper Arm Left Upper Arm   Pulse: 65    SpO2: 100%    Weight: 67.4 kg (148 lb 11.2 oz)    Height: 1.549 m (5' 1\")        No LMP recorded. (Menstrual status: Menopause).         \"Have you been to the ER, urgent care clinic since your last visit?  Hospitalized since your last visit?\"    NO    “Have you seen or consulted any other health care providers outside of [facility] since your last visit?”    NO

## 2024-12-04 LAB
T4 FREE SERPL-MCNC: 1.74 NG/DL (ref 0.82–1.77)
TSH SERPL DL<=0.005 MIU/L-ACNC: 0.16 UIU/ML (ref 0.45–4.5)

## 2024-12-08 LAB
THYROGLOB AB SERPL-ACNC: <1 IU/ML
THYROGLOB SERPL-MCNC: 0.4 NG/ML
THYROGLOB SERPL-MCNC: NORMAL NG/ML

## 2024-12-09 ENCOUNTER — OFFICE VISIT (OUTPATIENT)
Age: 74
End: 2024-12-09
Payer: COMMERCIAL

## 2024-12-09 VITALS
DIASTOLIC BLOOD PRESSURE: 75 MMHG | SYSTOLIC BLOOD PRESSURE: 118 MMHG | HEIGHT: 61 IN | WEIGHT: 144.6 LBS | OXYGEN SATURATION: 99 % | TEMPERATURE: 97.9 F | HEART RATE: 80 BPM | BODY MASS INDEX: 27.3 KG/M2

## 2024-12-09 DIAGNOSIS — R22.1 NECK MASS: ICD-10-CM

## 2024-12-09 DIAGNOSIS — E89.0 POSTPROCEDURAL HYPOTHYROIDISM: ICD-10-CM

## 2024-12-09 DIAGNOSIS — C73 THYROID CANCER (HCC): Primary | ICD-10-CM

## 2024-12-09 PROCEDURE — 99214 OFFICE O/P EST MOD 30 MIN: CPT | Performed by: INTERNAL MEDICINE

## 2024-12-09 PROCEDURE — G8427 DOCREV CUR MEDS BY ELIG CLIN: HCPCS | Performed by: INTERNAL MEDICINE

## 2024-12-09 PROCEDURE — G8484 FLU IMMUNIZE NO ADMIN: HCPCS | Performed by: INTERNAL MEDICINE

## 2024-12-09 PROCEDURE — 1036F TOBACCO NON-USER: CPT | Performed by: INTERNAL MEDICINE

## 2024-12-09 PROCEDURE — 1160F RVW MEDS BY RX/DR IN RCRD: CPT | Performed by: INTERNAL MEDICINE

## 2024-12-09 PROCEDURE — 1090F PRES/ABSN URINE INCON ASSESS: CPT | Performed by: INTERNAL MEDICINE

## 2024-12-09 PROCEDURE — G8399 PT W/DXA RESULTS DOCUMENT: HCPCS | Performed by: INTERNAL MEDICINE

## 2024-12-09 PROCEDURE — 1159F MED LIST DOCD IN RCRD: CPT | Performed by: INTERNAL MEDICINE

## 2024-12-09 PROCEDURE — G8419 CALC BMI OUT NRM PARAM NOF/U: HCPCS | Performed by: INTERNAL MEDICINE

## 2024-12-09 PROCEDURE — 1126F AMNT PAIN NOTED NONE PRSNT: CPT | Performed by: INTERNAL MEDICINE

## 2024-12-09 PROCEDURE — 1123F ACP DISCUSS/DSCN MKR DOCD: CPT | Performed by: INTERNAL MEDICINE

## 2024-12-09 PROCEDURE — 3017F COLORECTAL CA SCREEN DOC REV: CPT | Performed by: INTERNAL MEDICINE

## 2024-12-09 NOTE — PROGRESS NOTES
Jaqui Ricardo is a 74 y.o. female here for   Chief Complaint   Patient presents with    Thyroid Problem       1. Have you been to the ER, urgent care clinic since your last visit?  Hospitalized since your last visit? -No    2. Have you seen or consulted any other health care providers outside of the [facility] since your last visit?  Include any pap smears or colon screening.-No

## 2024-12-09 NOTE — PATIENT INSTRUCTIONS
SPECIFIC INSTRUCTIONS BELOW      unithroid  88  mcg  A day,   BRAND  Every day ,    on empty stomach with water only, no other meds or food or drinks   For next half hour      Take any kind of vitamins, calcium, iron   Pills  4 hours later           -------------PAY ATTENTION TO THESE GENERAL INSTRUCTIONS -----------------      - The medications prescribed at this visit will not be available at pharmacy until 6 pm       - YOUR MED LIST IS NOT UP TO DATE AS SOME CHANGES ARE BEING MADE AFTER THE VISIT - FOLLOW SPECIFIC INSTRUCTIONS  ABOVE     -ANY tests other than blood work, which you opt to do  outside the  [facility] facilities, you are responsible for prior authorizations if  required    - HEALTH MAINTENANCE IS NOT GOING TO BE UP TO DATE ON YOUR AVS- PLEASE IGNORE     Results     *Normal results will not be notified by a phone call starting January 1 2021   *If you have an upcoming visit, the results will be discussed at the visit   *Please sign up for MY CHART if you want access to your lab and test results  *Abnormal results which require immediate attention will be notified by phone call   *Abnormal results which do not require immediate assistance will be notified in 1-2 weeks       Refills    -    have your pharmacy send us a refill request . Refills are done max for one year and a visit is a must before refills are extended    Follow up appointments -  highly encourage you to make it when you are checking out. We can accommodate you into the schedule based on your clinical situation, but not for extending refills beyond a year. Labs are important to give refills and is important to get labs before the visit     Phone calls  -  Allow  24 hrs. for non-urgent calls to be returned  Prior authorization - It may take 2-4 weeks to process  Forms  -  FMLA, DMV etc., will take up to 2 weeks to process  Cancellations - please notify the office 2 days in advance   Samples  - will only be dispensed at visits

## 2024-12-09 NOTE — PROGRESS NOTES
[facility] DIABETES AND ENDOCRINOLOGY                Kylie Demarco MD FACE       HISTORY OF PRESENT ILLNESS   Jaqui Ricardo is a 74  y.o.  female.   HPI   Patient is here for follow up  for management of Papillary  Thyroid cancer   And hypothyroidism    From  Sept 2024        Weight is stable   She has not used  QSYMIA, she  does not want to try this medication  She is doing intermittent fasting         Sept 2024      Pt had her follow up usg , surveillance for PTC  It revealed presence of a neck mass  on right supraclavicular area     She is here concerned   Lost 1 lb   Appetite is good   Accompanied by her son at the visit today       June 2024     She had thyrogen stim  testing   done in the interim for Thyroid cancer   She passed it with no rise of TG   She is tolerating the  UNITHROID   Her sister got diagnosed with stomach ulcer - she is concerned about this   She wants to lose some weight          Old history :    Patient in general good state of health has Noticed hoarse voice 4 weeks ago    Got treated with antibiotics for a week, but hoarseness persisted    She saw Dr. Pacheco and he ordered Ct/ usg neck    He found left vocal cord palsy on laryngoscopy    He ordered for FNA of thyroid mass and left supraclavicular LN  Which revealed PTC   Ct scan showed locally invasive metastatic tissues involvement    She underwent near total thyroidectomy with radical dissection of ipsilateral LN and central dissection of LN surgery  On May 8 2019 at [facility]           Review of Systems    Constitutional: Negative.     Hoarse voice on and off    Psychiatric/Behavioral: Negative.           Physical Exam    Constitutional: She is oriented to person, place, and time. She appears well-developed and well-nourished.    Neck: Normal range of motion. Neck supple. (healed surgical scar for thyroidectomy ) present.    Psychiatric: She has a normal mood and affect.         Labs    Lab Results   Component Value Date

## 2025-01-08 DIAGNOSIS — E89.0 POSTOPERATIVE HYPOTHYROIDISM: ICD-10-CM

## 2025-01-08 RX ORDER — LEVOTHYROXINE SODIUM 88 UG/1
88 TABLET ORAL DAILY
Qty: 90 TABLET | Refills: 3 | Status: SHIPPED | OUTPATIENT
Start: 2025-01-08

## 2025-03-09 ENCOUNTER — HOSPITAL ENCOUNTER (OUTPATIENT)
Facility: HOSPITAL | Age: 75
Discharge: HOME | End: 2025-03-12
Payer: COMMERCIAL

## 2025-03-09 DIAGNOSIS — R22.1 NECK MASS: ICD-10-CM

## 2025-03-09 DIAGNOSIS — C73 THYROID CANCER (HCC): ICD-10-CM

## 2025-03-09 DIAGNOSIS — E89.0 POSTPROCEDURAL HYPOTHYROIDISM: ICD-10-CM

## 2025-03-09 PROCEDURE — 76536 US EXAM OF HEAD AND NECK: CPT

## 2025-04-06 PROBLEM — Z85.850 HX OF MALIGNANT NEOPLASM OF THYROID: Status: ACTIVE | Noted: 2025-04-06

## 2025-04-06 PROBLEM — C73 THYROID CANCER (HCC): Status: ACTIVE | Noted: 2021-04-06

## 2025-04-06 PROBLEM — Z00.00 MEDICARE ANNUAL WELLNESS VISIT, SUBSEQUENT: Status: ACTIVE | Noted: 2025-04-06

## 2025-04-25 ENCOUNTER — OFFICE VISIT (OUTPATIENT)
Age: 75
End: 2025-04-25
Payer: COMMERCIAL

## 2025-04-25 VITALS
WEIGHT: 144 LBS | DIASTOLIC BLOOD PRESSURE: 74 MMHG | HEART RATE: 90 BPM | HEIGHT: 61 IN | SYSTOLIC BLOOD PRESSURE: 120 MMHG | BODY MASS INDEX: 27.19 KG/M2 | OXYGEN SATURATION: 96 % | RESPIRATION RATE: 17 BRPM

## 2025-04-25 DIAGNOSIS — J38.01 PARALYSIS OF VOCAL CORDS AND LARYNX, UNILATERAL: ICD-10-CM

## 2025-04-25 DIAGNOSIS — E89.0 POSTPROCEDURAL HYPOTHYROIDISM: ICD-10-CM

## 2025-04-25 DIAGNOSIS — C73 MALIGNANT NEOPLASM OF THYROID GLAND (HCC): Primary | ICD-10-CM

## 2025-04-25 PROCEDURE — 1159F MED LIST DOCD IN RCRD: CPT | Performed by: OTOLARYNGOLOGY

## 2025-04-25 PROCEDURE — 1123F ACP DISCUSS/DSCN MKR DOCD: CPT | Performed by: OTOLARYNGOLOGY

## 2025-04-25 PROCEDURE — 99214 OFFICE O/P EST MOD 30 MIN: CPT | Performed by: OTOLARYNGOLOGY

## 2025-04-25 NOTE — PROGRESS NOTES
Subjective:      Jaqui Ricardo    72 y.o.    1950       Followup Visit    Location -thyroid      Quality -papillary thyroid cancer stage IV      Severity -moderate to severe      Duration -approximately 2 years      Timing -chronic     Context -following up today, patient is nearly 2 years out from total thyroidectomy and neck dissection for stage IV papillary thyroid cancer of the left thyroid with central neck and left lateral neck lymph nodes and invasion of the recurrent laryngeal  nerve, simultaneous left vocal cord injection.  She has been doing overall well has been following up with endocrinology last seen in October.  At that time things were relatively stable.  She has no new complaints today other than past couple of months  some intermittent left ear symptoms no pain occasional feeling of something moving in the ear and also radiating to the temple.  She does work out quite a bit has been swimming a lot, does complain of the left neck being a little stiff, no change in hearing      Modifying Features -none      Associated symptoms/signs -as above, does have a left-sided tooth that ultimately needs to be pulled     11/3/2021 -6-month follow-up thyroid cancer -patient overall has been doing well.  She had thyroid ultrasound last month and whole body iodine  scan in June.  Maintains on Unithroid.  Voice overall is stable but she does admit to having some fluctuations at times and occasionally some vocal fatigue.      5/11/22 -6-month follow-up, stage IV papillary thyroid carcinoma.  Patient states overall she has been doing well.  She continues her follow-up with endocrinology was last seen there just last month.  Physically she has been doing well.  Voice remains  the same with occasional fluctuations and some vocal fatigue after longer conversations.  No prolonged episodes of losing her voice.  Swallowing well.      11/9/22 - 6 mos follow-up, she continues to do well.  Had recent imaging and

## 2025-05-06 PROBLEM — Z00.00 MEDICARE ANNUAL WELLNESS VISIT, SUBSEQUENT: Status: RESOLVED | Noted: 2025-04-06 | Resolved: 2025-05-06

## 2025-05-08 ENCOUNTER — LAB (OUTPATIENT)
Age: 75
End: 2025-05-08

## 2025-05-08 DIAGNOSIS — E89.0 POSTPROCEDURAL HYPOTHYROIDISM: ICD-10-CM

## 2025-05-08 DIAGNOSIS — C73 THYROID CANCER (HCC): ICD-10-CM

## 2025-05-08 LAB
T4 FREE SERPL-MCNC: 1.3 NG/DL (ref 0.8–1.5)
TSH SERPL DL<=0.05 MIU/L-ACNC: 0.47 UIU/ML (ref 0.36–3.74)

## 2025-05-14 LAB
THYROGLOBULIN (ICMA): 0.9 NG/ML
THYROGLOBULIN (TG-RIA): NORMAL NG/ML
THYROGLOBULIN AB: <1 IU/ML

## 2025-05-15 ENCOUNTER — OFFICE VISIT (OUTPATIENT)
Age: 75
End: 2025-05-15
Payer: COMMERCIAL

## 2025-05-15 VITALS
SYSTOLIC BLOOD PRESSURE: 129 MMHG | HEART RATE: 74 BPM | WEIGHT: 146.2 LBS | DIASTOLIC BLOOD PRESSURE: 81 MMHG | HEIGHT: 61 IN | OXYGEN SATURATION: 99 % | BODY MASS INDEX: 27.6 KG/M2 | TEMPERATURE: 97.9 F

## 2025-05-15 DIAGNOSIS — E89.0 POSTPROCEDURAL HYPOTHYROIDISM: ICD-10-CM

## 2025-05-15 DIAGNOSIS — E89.0 POSTOPERATIVE HYPOTHYROIDISM: ICD-10-CM

## 2025-05-15 DIAGNOSIS — C73 THYROID CANCER (HCC): Primary | ICD-10-CM

## 2025-05-15 PROCEDURE — 1123F ACP DISCUSS/DSCN MKR DOCD: CPT | Performed by: INTERNAL MEDICINE

## 2025-05-15 PROCEDURE — 99214 OFFICE O/P EST MOD 30 MIN: CPT | Performed by: INTERNAL MEDICINE

## 2025-05-15 NOTE — PATIENT INSTRUCTIONS
SPECIFIC INSTRUCTIONS BELOW      unithroid  88  mcg  A day,   BRAND  Every day ,    on empty stomach with water only, no other meds or food or drinks   For next half hour      Take any kind of vitamins, calcium, iron   Pills  4 hours later           -------------PAY ATTENTION TO THESE GENERAL INSTRUCTIONS -----------------      - The medications prescribed at this visit will not be available at pharmacy until 6 pm       - YOUR MED LIST IS NOT UP TO DATE AS SOME CHANGES ARE BEING MADE AFTER THE VISIT - FOLLOW SPECIFIC INSTRUCTIONS  ABOVE     -ANY tests other than blood work, which you opt to do  outside the  [facility] facilities, you are responsible for prior authorizations if  required    - HEALTH MAINTENANCE IS NOT GOING TO BE UP TO DATE ON YOUR AVS- PLEASE IGNORE     Results     *Normal results will not be notified by a phone call starting January 1 2021   *If you have an upcoming visit, the results will be discussed at the visit   *Please sign up for MY CHART if you want access to your lab and test results  *Abnormal results which require immediate attention will be notified by phone call   *Abnormal results which do not require immediate assistance will be notified in 1-2 weeks       Refills    -    have your pharmacy send us a refill request . Refills are done max for one year and a visit is a must before refills are extended    Follow up appointments -  highly encourage you to make it when you are checking out. We can accommodate you into the schedule based on your clinical situation, but not for extending refills beyond a year. Labs are important to give refills and is important to get labs before the visit     Phone calls  -  Allow  24 hrs. for non-urgent calls to be returned  Prior authorization - It may take 2-4 weeks to process  Forms  -  FMLA, DMV etc., will take up to 2 weeks to process  Cancellations - please notify the office 2 days in advance   Samples  - will only be dispensed at visits

## 2025-05-15 NOTE — PROGRESS NOTES
Jaqui Ricardo is a 74 y.o. female here for   Chief Complaint   Patient presents with    Thyroid Problem       1. Have you been to the ER, urgent care clinic since your last visit?  Hospitalized since your last visit? -No    2. Have you seen or consulted any other health care providers outside of the [facility] since your last visit?  Include any pap smears or colon screening.-No

## 2025-05-15 NOTE — PROGRESS NOTES
[facility] DIABETES AND ENDOCRINOLOGY                Kylie Demarco MD FACE        HISTORY OF PRESENT ILLNESS   Jaqui Ricardo is a 74  y.o.  female.   HPI   Patient is here for follow up  for management of Papillary  Thyroid cancer   And   hypothyroidism    From   December 2024      Gained 2 lbs       Dec 2024   Weight is stable   She has not used  QSYMIA, she  does not want to try this medication  She is doing intermittent fasting         Sept 2024      Pt had her follow up usg , surveillance for PTC  It revealed presence of a neck mass  on right supraclavicular area     She is here concerned   Lost 1 lb   Appetite is good   Accompanied by her son at the visit today       June 2024     She had thyrogen stim  testing   done in the interim for Thyroid cancer   She passed it with no rise of TG   She is tolerating the  UNITHROID   Her sister got diagnosed with stomach ulcer - she is concerned about this   She wants to lose some weight          Old history :    Patient in general good state of health has Noticed hoarse voice 4 weeks ago    Got treated with antibiotics for a week, but hoarseness persisted    She saw Dr. Pacheco and he ordered Ct/ usg neck    He found left vocal cord palsy on laryngoscopy    He ordered for FNA of thyroid mass and left supraclavicular LN  Which revealed PTC   Ct scan showed locally invasive metastatic tissues involvement    She underwent near total thyroidectomy with radical dissection of ipsilateral LN and central dissection of LN surgery  On May 8 2019 at [facility]           Review of Systems    Constitutional: Negative.     Hoarse voice on and off    Psychiatric/Behavioral: Negative.           Physical Exam    Constitutional: She is oriented to person, place, and time. She appears well-developed and well-nourished.    Neck: Normal range of motion. Neck supple. (healed surgical scar for thyroidectomy ) present.    Psychiatric: She has a normal mood and affect.

## 2025-07-21 PROBLEM — Z00.00 ANNUAL PHYSICAL EXAM: Status: ACTIVE | Noted: 2025-07-21

## 2025-07-21 PROBLEM — I25.10 CORONARY ARTERY CALCIFICATION: Status: ACTIVE | Noted: 2025-07-21

## 2025-07-21 PROBLEM — R73.03 PREDIABETES: Status: ACTIVE | Noted: 2025-07-21

## 2025-07-22 ENCOUNTER — OFFICE VISIT (OUTPATIENT)
Facility: CLINIC | Age: 75
End: 2025-07-22
Payer: COMMERCIAL

## 2025-07-22 VITALS
HEIGHT: 60 IN | BODY MASS INDEX: 28.7 KG/M2 | SYSTOLIC BLOOD PRESSURE: 126 MMHG | RESPIRATION RATE: 18 BRPM | HEART RATE: 77 BPM | DIASTOLIC BLOOD PRESSURE: 84 MMHG | TEMPERATURE: 97.5 F | WEIGHT: 146.2 LBS | OXYGEN SATURATION: 98 %

## 2025-07-22 DIAGNOSIS — R73.03 PREDIABETES: ICD-10-CM

## 2025-07-22 DIAGNOSIS — Z85.850 HX OF MALIGNANT NEOPLASM OF THYROID: ICD-10-CM

## 2025-07-22 DIAGNOSIS — E53.8 VITAMIN B12 DEFICIENCY (NON ANEMIC): ICD-10-CM

## 2025-07-22 DIAGNOSIS — E55.9 VITAMIN D DEFICIENCY: ICD-10-CM

## 2025-07-22 DIAGNOSIS — Z00.00 ANNUAL PHYSICAL EXAM: ICD-10-CM

## 2025-07-22 DIAGNOSIS — E89.0 POSTOPERATIVE HYPOTHYROIDISM: ICD-10-CM

## 2025-07-22 DIAGNOSIS — E78.5 DYSLIPIDEMIA: ICD-10-CM

## 2025-07-22 DIAGNOSIS — I25.10 CORONARY ARTERY CALCIFICATION: ICD-10-CM

## 2025-07-22 PROCEDURE — 99397 PER PM REEVAL EST PAT 65+ YR: CPT | Performed by: INTERNAL MEDICINE

## 2025-07-22 PROCEDURE — 99213 OFFICE O/P EST LOW 20 MIN: CPT | Performed by: INTERNAL MEDICINE

## 2025-07-22 RX ORDER — BLOOD SUGAR DIAGNOSTIC
STRIP MISCELLANEOUS
Qty: 100 EACH | Refills: 3 | Status: SHIPPED | OUTPATIENT
Start: 2025-07-22

## 2025-07-22 RX ORDER — GLUCOSAMINE HCL/CHONDROITIN SU 500-400 MG
CAPSULE ORAL
Qty: 100 STRIP | Refills: 3 | Status: SHIPPED | OUTPATIENT
Start: 2025-07-22

## 2025-07-22 RX ORDER — ATORVASTATIN CALCIUM 20 MG/1
20 TABLET, FILM COATED ORAL DAILY
Qty: 90 TABLET | Refills: 3 | Status: SHIPPED | OUTPATIENT
Start: 2025-07-22

## 2025-07-22 RX ORDER — DIPHENHYDRAMINE HYDROCHLORIDE 25 MG/1
CAPSULE, LIQUID FILLED ORAL
Qty: 1 KIT | Refills: 2 | Status: SHIPPED | OUTPATIENT
Start: 2025-07-22

## 2025-07-22 RX ORDER — ALCOHOL ANTISEPTIC PADS
PADS, MEDICATED (EA) TOPICAL
Qty: 100 EACH | Refills: 3 | Status: SHIPPED | OUTPATIENT
Start: 2025-07-22

## 2025-07-22 SDOH — ECONOMIC STABILITY: FOOD INSECURITY: WITHIN THE PAST 12 MONTHS, YOU WORRIED THAT YOUR FOOD WOULD RUN OUT BEFORE YOU GOT MONEY TO BUY MORE.: NEVER TRUE

## 2025-07-22 SDOH — ECONOMIC STABILITY: FOOD INSECURITY: WITHIN THE PAST 12 MONTHS, THE FOOD YOU BOUGHT JUST DIDN'T LAST AND YOU DIDN'T HAVE MONEY TO GET MORE.: NEVER TRUE

## 2025-07-22 ASSESSMENT — PATIENT HEALTH QUESTIONNAIRE - PHQ9
SUM OF ALL RESPONSES TO PHQ QUESTIONS 1-9: 0
1. LITTLE INTEREST OR PLEASURE IN DOING THINGS: NOT AT ALL
SUM OF ALL RESPONSES TO PHQ QUESTIONS 1-9: 0
2. FEELING DOWN, DEPRESSED OR HOPELESS: NOT AT ALL

## 2025-07-22 ASSESSMENT — ENCOUNTER SYMPTOMS
RESPIRATORY NEGATIVE: 1
ALLERGIC/IMMUNOLOGIC NEGATIVE: 1
GASTROINTESTINAL NEGATIVE: 1

## 2025-07-22 ASSESSMENT — LIFESTYLE VARIABLES
HOW MANY STANDARD DRINKS CONTAINING ALCOHOL DO YOU HAVE ON A TYPICAL DAY: 1 OR 2
HOW OFTEN DO YOU HAVE A DRINK CONTAINING ALCOHOL: MONTHLY OR LESS

## 2025-07-22 NOTE — PROGRESS NOTES
\"Have you been to the ER, urgent care clinic since your last visit?  Hospitalized since your last visit?\"    NO    “Have you seen or consulted any other [facility]?”    No      Chief Complaint   Patient presents with    Medicare AWV    Follow-up Chronic Condition     /84 (BP Site: Left Upper Arm, Patient Position: Sitting, BP Cuff Size: Medium Adult)   Pulse 77   Temp 97.5 °F (36.4 °C) (Temporal)   Resp 18   Ht 1.524 m (5')   Wt 66.3 kg (146 lb 3.2 oz)   SpO2 98%   BMI 28.55 kg/m²         Click Here for Release of Records Request

## 2025-07-22 NOTE — PROGRESS NOTES
Jaqui Ricardo (: 1950) is a 75 y.o. female, Established patient patient, here for evaluation of the following chief complaint(s):  Follow-up Chronic Condition and Annual Exam         ASSESSMENT/PLAN:  Below is the assessment and plan developed based on review of pertinent history, physical exam, labs, studies, and medications.      1. Annual physical exam  -     Lipid Panel; Future  -     Comprehensive Metabolic Panel; Future  -     CBC with Auto Differential; Future  2. Postoperative hypothyroidism  -     TSH reflex to T4F; Future  3. Prediabetes  -     Hemoglobin A1C; Future  -     Alcohol Swabs (ALCOHOL PADS) 70 % PADS; Disp-100 each, R-3, NormalUse to check glucose once a day  -     Blood Glucose Monitoring Suppl (BLOOD GLUCOSE MONITOR SYSTEM) w/Device KIT; Disp-1 kit, R-2, NormalTo check sugar once a day  -     blood glucose monitor strips; Test sugar once a day a day, Disp-100 strip, R-3, Normal  -     Lancets 30G MISC; Disp-100 each, R-3, NormalTo check once a day  4. Dyslipidemia  -     Lipid Panel; Future  5. Hx of malignant neoplasm of thyroid  6. Coronary artery calcification  7. Vitamin D deficiency  -     Vitamin D 25 Hydroxy; Future  -     Vitamin B12; Future  8. Vitamin B12 deficiency (non anemic)  -     Vitamin B12; Future    Annual preventive health exam.  Age-appropriate preventive health education screening vaccinations discussed.    She has not done mammogram at [facility] we will try to get the results.  She says it was normal    She has done GYN checkup.  She is up to date with her colonoscopy.    Her blood pressure is well-controlled without being on medicines.  She has done cardiac checkup with cardiologist Dr. Renner and echo was normal and EKG was showing normal sinus rhythm in the past she had history of cardiomegaly and coronary artery calcification which was nonobstructive.    Dyslipidemia getting atorvastatin she is eating healthy diet doing a lot of exercise yoga.

## 2025-07-23 LAB
25(OH)D3+25(OH)D2 SERPL-MCNC: 40.1 NG/ML (ref 30–100)
ALBUMIN SERPL-MCNC: 4.3 G/DL (ref 3.8–4.8)
ALP SERPL-CCNC: 110 IU/L (ref 44–121)
ALT SERPL-CCNC: 22 IU/L (ref 0–32)
AST SERPL-CCNC: 27 IU/L (ref 0–40)
BASOPHILS # BLD AUTO: 0 X10E3/UL (ref 0–0.2)
BASOPHILS NFR BLD AUTO: 0 %
BILIRUB SERPL-MCNC: 0.7 MG/DL (ref 0–1.2)
BUN SERPL-MCNC: 9 MG/DL (ref 8–27)
BUN/CREAT SERPL: 15 (ref 12–28)
CALCIUM SERPL-MCNC: 9.7 MG/DL (ref 8.7–10.3)
CHLORIDE SERPL-SCNC: 99 MMOL/L (ref 96–106)
CHOLEST SERPL-MCNC: 168 MG/DL (ref 100–199)
CO2 SERPL-SCNC: 22 MMOL/L (ref 20–29)
CREAT SERPL-MCNC: 0.62 MG/DL (ref 0.57–1)
EGFRCR SERPLBLD CKD-EPI 2021: 94 ML/MIN/1.73
EOSINOPHIL # BLD AUTO: 0.1 X10E3/UL (ref 0–0.4)
EOSINOPHIL NFR BLD AUTO: 1 %
ERYTHROCYTE [DISTWIDTH] IN BLOOD BY AUTOMATED COUNT: 12.2 % (ref 11.7–15.4)
EST. AVERAGE GLUCOSE BLD GHB EST-MCNC: 126 MG/DL
GLOBULIN SER CALC-MCNC: 2.4 G/DL (ref 1.5–4.5)
GLUCOSE SERPL-MCNC: 90 MG/DL (ref 70–99)
HBA1C MFR BLD: 6 % (ref 4.8–5.6)
HCT VFR BLD AUTO: 44.5 % (ref 34–46.6)
HDLC SERPL-MCNC: 69 MG/DL
HGB BLD-MCNC: 14.3 G/DL (ref 11.1–15.9)
IMM GRANULOCYTES # BLD AUTO: 0 X10E3/UL (ref 0–0.1)
IMM GRANULOCYTES NFR BLD AUTO: 0 %
LDLC SERPL CALC-MCNC: 85 MG/DL (ref 0–99)
LYMPHOCYTES # BLD AUTO: 1.8 X10E3/UL (ref 0.7–3.1)
LYMPHOCYTES NFR BLD AUTO: 26 %
MCH RBC QN AUTO: 30.7 PG (ref 26.6–33)
MCHC RBC AUTO-ENTMCNC: 32.1 G/DL (ref 31.5–35.7)
MCV RBC AUTO: 96 FL (ref 79–97)
MONOCYTES # BLD AUTO: 0.4 X10E3/UL (ref 0.1–0.9)
MONOCYTES NFR BLD AUTO: 6 %
NEUTROPHILS # BLD AUTO: 4.5 X10E3/UL (ref 1.4–7)
NEUTROPHILS NFR BLD AUTO: 67 %
PLATELET # BLD AUTO: 368 X10E3/UL (ref 150–450)
POTASSIUM SERPL-SCNC: 4.7 MMOL/L (ref 3.5–5.2)
PROT SERPL-MCNC: 6.7 G/DL (ref 6–8.5)
RBC # BLD AUTO: 4.66 X10E6/UL (ref 3.77–5.28)
SODIUM SERPL-SCNC: 138 MMOL/L (ref 134–144)
TRIGL SERPL-MCNC: 71 MG/DL (ref 0–149)
TSH SERPL DL<=0.005 MIU/L-ACNC: 0.13 UIU/ML (ref 0.45–4.5)
VIT B12 SERPL-MCNC: 598 PG/ML (ref 232–1245)
VLDLC SERPL CALC-MCNC: 14 MG/DL (ref 5–40)
WBC # BLD AUTO: 6.8 X10E3/UL (ref 3.4–10.8)

## (undated) DEVICE — Device

## (undated) DEVICE — SPLINT ORTH W4XL15IN PLSTR OF PARIS LO EXOTHERM SMOOTH

## (undated) DEVICE — GLOVE SURG SZ 8 L12IN FNGR THK79MIL GRN LTX FREE

## (undated) DEVICE — CORD BPLR 12FT SGL USE CLR

## (undated) DEVICE — BLADE,CARBON-STEEL,15,STRL,DISPOSABLE,TB: Brand: MEDLINE

## (undated) DEVICE — SYRINGE MED 10ML TRNSLUC BRL PLUNG BLK MRK POLYPR CTRL

## (undated) DEVICE — SUTURE VCRL RAPIDE SZ 4-0 L27IN ABSRB UD PS-2 L19MM 3/8 CIR VR426

## (undated) DEVICE — SLING ARM AD UNIV

## (undated) DEVICE — BANDAGE CAST W4INXL5YD PLSTR OF PARIS FAST SET 5 8MIN LO

## (undated) DEVICE — PADDING UNDERCAST W4INXL12FT RAYON POLY SYN NONADHESIVE

## (undated) DEVICE — SUTURE ABSORBABLE BRAIDED 2-0 CP2 27 IN UD VICRYL + VCP869H

## (undated) DEVICE — BASIC SINGLE BASIN-LF: Brand: MEDLINE INDUSTRIES, INC.

## (undated) DEVICE — LIQUIBAND RAPID ADHESIVE 36/CS 0.8ML: Brand: MEDLINE

## (undated) DEVICE — ZIMMER® STERILE DISPOSABLE TOURNIQUET CUFF WITH PLC, DUAL PORT, SINGLE BLADDER, 18 IN. (46 CM)

## (undated) DEVICE — DRAPE,HAND,STERILE: Brand: MEDLINE

## (undated) DEVICE — PADDING CAST W4INXL4YD ST COT RAYON MICROPLEATED HIGHLY

## (undated) DEVICE — SPONGE GZ W4XL4IN COT 12 PLY TYP VII WVN C FLD DSGN STERILE

## (undated) DEVICE — MINOR EXTREMITY PACK: Brand: MEDLINE INDUSTRIES, INC.

## (undated) DEVICE — THE STERILE LIGHT HANDLE COVER IS USED WITH STERIS SURGICAL LIGHTING AND VISUALIZATION SYSTEMS.

## (undated) DEVICE — SUTURE MCRYL + SZ 3-0 L27IN ABSRB UD PS1 L24MM 3/8 CIR REV MCP936H

## (undated) DEVICE — GARMENT,MEDLINE,DVT,INT,CALF,MED, GEN2: Brand: MEDLINE

## (undated) DEVICE — APPLICATOR MEDICATED 26 CC SOLUTION HI LT ORNG CHLORAPREP

## (undated) DEVICE — DRESSING,GAUZE,XEROFORM,CURAD,1"X8",ST: Brand: CURAD

## (undated) DEVICE — BANDAGE COBAN 4 IN COMPR W4INXL5YD FOAM COHESIVE QUIK STK SELF ADH SFT

## (undated) DEVICE — HYPODERMIC SAFETY NEEDLE: Brand: MONOJECT

## (undated) DEVICE — SOUTHSIDE TURNOVER: Brand: MEDLINE INDUSTRIES, INC.

## (undated) DEVICE — PREP PAD BNS: Brand: CONVERTORS

## (undated) DEVICE — BANDAGE COMPR W4INXL5YD WHT BGE POLY COT M E WRP WV HK AND

## (undated) DEVICE — SUTURE VCRL RAPIDE SZ 3-0 L18IN ABSRB UD PS-2 L19MM 3/8 CIR VR497

## (undated) DEVICE — GLOVE ORTHO 7 1/2   MSG9475

## (undated) DEVICE — 1010 S-DRAPE TOWEL DRAPE 10/BX: Brand: STERI-DRAPE™